# Patient Record
Sex: MALE | Race: ASIAN | NOT HISPANIC OR LATINO | URBAN - METROPOLITAN AREA
[De-identification: names, ages, dates, MRNs, and addresses within clinical notes are randomized per-mention and may not be internally consistent; named-entity substitution may affect disease eponyms.]

---

## 2020-04-06 ENCOUNTER — INPATIENT (INPATIENT)
Facility: HOSPITAL | Age: 55
LOS: 7 days | Discharge: HOME | DRG: 177 | End: 2020-04-14
Attending: INTERNAL MEDICINE | Admitting: INTERNAL MEDICINE
Payer: COMMERCIAL

## 2020-04-06 VITALS
TEMPERATURE: 99 F | SYSTOLIC BLOOD PRESSURE: 92 MMHG | DIASTOLIC BLOOD PRESSURE: 57 MMHG | RESPIRATION RATE: 17 BRPM | OXYGEN SATURATION: 89 % | WEIGHT: 153 LBS | HEIGHT: 66 IN | HEART RATE: 87 BPM

## 2020-04-06 DIAGNOSIS — R09.02 HYPOXEMIA: ICD-10-CM

## 2020-04-06 LAB
ALBUMIN SERPL ELPH-MCNC: 2.7 G/DL — LOW (ref 3.5–5)
ALP SERPL-CCNC: 71 U/L — SIGNIFICANT CHANGE UP (ref 40–120)
ALT FLD-CCNC: 63 U/L DA — HIGH (ref 10–60)
ANION GAP SERPL CALC-SCNC: 7 MMOL/L — SIGNIFICANT CHANGE UP (ref 5–17)
AST SERPL-CCNC: 55 U/L — HIGH (ref 10–40)
BASE EXCESS BLDV CALC-SCNC: 0.4 MMOL/L — SIGNIFICANT CHANGE UP (ref -2–2)
BASOPHILS # BLD AUTO: 0.01 K/UL — SIGNIFICANT CHANGE UP (ref 0–0.2)
BASOPHILS NFR BLD AUTO: 0.1 % — SIGNIFICANT CHANGE UP (ref 0–2)
BILIRUB SERPL-MCNC: 0.5 MG/DL — SIGNIFICANT CHANGE UP (ref 0.2–1.2)
BUN SERPL-MCNC: 8 MG/DL — SIGNIFICANT CHANGE UP (ref 7–18)
CALCIUM SERPL-MCNC: 8.4 MG/DL — SIGNIFICANT CHANGE UP (ref 8.4–10.5)
CHLORIDE SERPL-SCNC: 106 MMOL/L — SIGNIFICANT CHANGE UP (ref 96–108)
CO2 SERPL-SCNC: 22 MMOL/L — SIGNIFICANT CHANGE UP (ref 22–31)
CREAT SERPL-MCNC: 0.8 MG/DL — SIGNIFICANT CHANGE UP (ref 0.5–1.3)
EOSINOPHIL # BLD AUTO: 0 K/UL — SIGNIFICANT CHANGE UP (ref 0–0.5)
EOSINOPHIL NFR BLD AUTO: 0 % — SIGNIFICANT CHANGE UP (ref 0–6)
GLUCOSE SERPL-MCNC: 184 MG/DL — HIGH (ref 70–99)
HCO3 BLDV-SCNC: 25 MMOL/L — SIGNIFICANT CHANGE UP (ref 21–29)
HCT VFR BLD CALC: 32.9 % — LOW (ref 39–50)
HGB BLD-MCNC: 10.6 G/DL — LOW (ref 13–17)
HOROWITZ INDEX BLDV+IHG-RTO: 21 — SIGNIFICANT CHANGE UP
IMM GRANULOCYTES NFR BLD AUTO: 1 % — SIGNIFICANT CHANGE UP (ref 0–1.5)
LACTATE SERPL-SCNC: 1.7 MMOL/L — SIGNIFICANT CHANGE UP (ref 0.7–2)
LYMPHOCYTES # BLD AUTO: 1.23 K/UL — SIGNIFICANT CHANGE UP (ref 1–3.3)
LYMPHOCYTES # BLD AUTO: 17.3 % — SIGNIFICANT CHANGE UP (ref 13–44)
MCHC RBC-ENTMCNC: 27.7 PG — SIGNIFICANT CHANGE UP (ref 27–34)
MCHC RBC-ENTMCNC: 32.2 GM/DL — SIGNIFICANT CHANGE UP (ref 32–36)
MCV RBC AUTO: 86.1 FL — SIGNIFICANT CHANGE UP (ref 80–100)
MONOCYTES # BLD AUTO: 0.5 K/UL — SIGNIFICANT CHANGE UP (ref 0–0.9)
MONOCYTES NFR BLD AUTO: 7 % — SIGNIFICANT CHANGE UP (ref 2–14)
NEUTROPHILS # BLD AUTO: 5.31 K/UL — SIGNIFICANT CHANGE UP (ref 1.8–7.4)
NEUTROPHILS NFR BLD AUTO: 74.6 % — SIGNIFICANT CHANGE UP (ref 43–77)
NRBC # BLD: 0 /100 WBCS — SIGNIFICANT CHANGE UP (ref 0–0)
PCO2 BLDV: 45 MMHG — SIGNIFICANT CHANGE UP (ref 35–50)
PH BLDV: 7.37 — SIGNIFICANT CHANGE UP (ref 7.35–7.45)
PLATELET # BLD AUTO: 308 K/UL — SIGNIFICANT CHANGE UP (ref 150–400)
PO2 BLDV: 19 MMHG — LOW (ref 25–45)
POTASSIUM SERPL-MCNC: 4.2 MMOL/L — SIGNIFICANT CHANGE UP (ref 3.5–5.3)
POTASSIUM SERPL-SCNC: 4.2 MMOL/L — SIGNIFICANT CHANGE UP (ref 3.5–5.3)
PROT SERPL-MCNC: 7.1 G/DL — SIGNIFICANT CHANGE UP (ref 6–8.3)
RBC # BLD: 3.82 M/UL — LOW (ref 4.2–5.8)
RBC # FLD: 12.3 % — SIGNIFICANT CHANGE UP (ref 10.3–14.5)
SAO2 % BLDV: 23 % — LOW (ref 67–88)
SODIUM SERPL-SCNC: 135 MMOL/L — SIGNIFICANT CHANGE UP (ref 135–145)
TROPONIN I SERPL-MCNC: <0.015 NG/ML — SIGNIFICANT CHANGE UP (ref 0–0.04)
WBC # BLD: 7.12 K/UL — SIGNIFICANT CHANGE UP (ref 3.8–10.5)
WBC # FLD AUTO: 7.12 K/UL — SIGNIFICANT CHANGE UP (ref 3.8–10.5)

## 2020-04-06 PROCEDURE — 99285 EMERGENCY DEPT VISIT HI MDM: CPT

## 2020-04-06 PROCEDURE — 71045 X-RAY EXAM CHEST 1 VIEW: CPT | Mod: 26

## 2020-04-06 RX ORDER — SODIUM CHLORIDE 9 MG/ML
500 INJECTION INTRAMUSCULAR; INTRAVENOUS; SUBCUTANEOUS ONCE
Refills: 0 | Status: COMPLETED | OUTPATIENT
Start: 2020-04-06 | End: 2020-04-06

## 2020-04-06 RX ADMIN — SODIUM CHLORIDE 500 MILLILITER(S): 9 INJECTION INTRAMUSCULAR; INTRAVENOUS; SUBCUTANEOUS at 19:40

## 2020-04-06 NOTE — ED PROVIDER NOTE - OBJECTIVE STATEMENT
Attending MD Leavitt.  Pt is a 54 yo male with pmhx of DM (on oral medication only, no insulin), HLD, 2 prev cardiac stents, lifetime non-smoker.  Pt presents to ED because he has had a cough, fever, cold sxs since Wednesday of this week.  Endorses fever, and generalized malaise.  In Ed currently pt is sating 88% at rest ORA, inc to 97% on NRB.  He was seen at [facility] over the last few days and rxed '2 antibiotics and a cough medicine but it is not helping'.  Pt's Wife: Alyssa Suárez phone [phone] has been updated with same [facility] .

## 2020-04-06 NOTE — ED ADULT NURSE NOTE - ED STAT RN HANDOFF DETAILS
Report given to ZACHARIAH Carter. No acute distress noted, denies chest pain, no shortness of breath indicated. Safety maintained.

## 2020-04-06 NOTE — ED PROVIDER NOTE - PROGRESS NOTE DETAILS
Attending MD Leavitt.  Pt endorsed to Dr. Samuels and MAR for admission to medicine in stable condition.

## 2020-04-06 NOTE — ED ADULT NURSE NOTE - OBJECTIVE STATEMENT
Pt c/o on and off fever for 2 weeks. Pt sitting in chair, no acute distress noted, pt on o2 non-rebreather. Safety maintained.

## 2020-04-06 NOTE — ED PROVIDER NOTE - CLINICAL SUMMARY MEDICAL DECISION MAKING FREE TEXT BOX
Attending MD Leavitt.  Pt has probable COVID-19 and is requiring oxygen to keep o2 sats >90% at rest.  Planned labs, CXR, EKG and admission.  Will administer ceftriaxone for PPX of bacterial PNA.

## 2020-04-06 NOTE — ED PROVIDER NOTE - PMH
Diabetes    Hyperlipidemia, unspecified hyperlipidemia type    Hypertension    Stented coronary artery  2

## 2020-04-07 DIAGNOSIS — E11.9 TYPE 2 DIABETES MELLITUS WITHOUT COMPLICATIONS: ICD-10-CM

## 2020-04-07 DIAGNOSIS — Z29.9 ENCOUNTER FOR PROPHYLACTIC MEASURES, UNSPECIFIED: ICD-10-CM

## 2020-04-07 DIAGNOSIS — E78.5 HYPERLIPIDEMIA, UNSPECIFIED: ICD-10-CM

## 2020-04-07 DIAGNOSIS — I10 ESSENTIAL (PRIMARY) HYPERTENSION: ICD-10-CM

## 2020-04-07 DIAGNOSIS — J18.9 PNEUMONIA, UNSPECIFIED ORGANISM: ICD-10-CM

## 2020-04-07 DIAGNOSIS — I25.10 ATHEROSCLEROTIC HEART DISEASE OF NATIVE CORONARY ARTERY WITHOUT ANGINA PECTORIS: ICD-10-CM

## 2020-04-07 DIAGNOSIS — J96.01 ACUTE RESPIRATORY FAILURE WITH HYPOXIA: ICD-10-CM

## 2020-04-07 LAB
GLUCOSE BLDC GLUCOMTR-MCNC: 140 MG/DL — HIGH (ref 70–99)
GLUCOSE BLDC GLUCOMTR-MCNC: 155 MG/DL — HIGH (ref 70–99)
GLUCOSE BLDC GLUCOMTR-MCNC: 157 MG/DL — HIGH (ref 70–99)

## 2020-04-07 PROCEDURE — 99223 1ST HOSP IP/OBS HIGH 75: CPT

## 2020-04-07 RX ORDER — HYDROXYCHLOROQUINE SULFATE 200 MG
TABLET ORAL
Refills: 0 | Status: COMPLETED | OUTPATIENT
Start: 2020-04-07 | End: 2020-04-11

## 2020-04-07 RX ORDER — THIAMINE MONONITRATE (VIT B1) 100 MG
200 TABLET ORAL DAILY
Refills: 0 | Status: DISCONTINUED | OUTPATIENT
Start: 2020-04-07 | End: 2020-04-07

## 2020-04-07 RX ORDER — ASCORBIC ACID 60 MG
500 TABLET,CHEWABLE ORAL DAILY
Refills: 0 | Status: DISCONTINUED | OUTPATIENT
Start: 2020-04-07 | End: 2020-04-07

## 2020-04-07 RX ORDER — CLOPIDOGREL BISULFATE 75 MG/1
1 TABLET, FILM COATED ORAL
Qty: 0 | Refills: 0 | DISCHARGE

## 2020-04-07 RX ORDER — AZITHROMYCIN 500 MG/1
500 TABLET, FILM COATED ORAL DAILY
Refills: 0 | Status: COMPLETED | OUTPATIENT
Start: 2020-04-07 | End: 2020-04-07

## 2020-04-07 RX ORDER — INSULIN LISPRO 100/ML
VIAL (ML) SUBCUTANEOUS
Refills: 0 | Status: DISCONTINUED | OUTPATIENT
Start: 2020-04-07 | End: 2020-04-14

## 2020-04-07 RX ORDER — HEPARIN SODIUM 5000 [USP'U]/ML
5000 INJECTION INTRAVENOUS; SUBCUTANEOUS EVERY 8 HOURS
Refills: 0 | Status: DISCONTINUED | OUTPATIENT
Start: 2020-04-07 | End: 2020-04-14

## 2020-04-07 RX ORDER — HYDROXYCHLOROQUINE SULFATE 200 MG
400 TABLET ORAL EVERY 12 HOURS
Refills: 0 | Status: COMPLETED | OUTPATIENT
Start: 2020-04-07 | End: 2020-04-07

## 2020-04-07 RX ORDER — THIAMINE MONONITRATE (VIT B1) 100 MG
200 TABLET ORAL EVERY 24 HOURS
Refills: 0 | Status: DISCONTINUED | OUTPATIENT
Start: 2020-04-07 | End: 2020-04-07

## 2020-04-07 RX ORDER — CHOLECALCIFEROL (VITAMIN D3) 125 MCG
1000 CAPSULE ORAL DAILY
Refills: 0 | Status: DISCONTINUED | OUTPATIENT
Start: 2020-04-07 | End: 2020-04-14

## 2020-04-07 RX ORDER — HYDROXYCHLOROQUINE SULFATE 200 MG
200 TABLET ORAL EVERY 12 HOURS
Refills: 0 | Status: COMPLETED | OUTPATIENT
Start: 2020-04-08 | End: 2020-04-11

## 2020-04-07 RX ADMIN — HEPARIN SODIUM 5000 UNIT(S): 5000 INJECTION INTRAVENOUS; SUBCUTANEOUS at 12:13

## 2020-04-07 RX ADMIN — HEPARIN SODIUM 5000 UNIT(S): 5000 INJECTION INTRAVENOUS; SUBCUTANEOUS at 06:11

## 2020-04-07 RX ADMIN — AZITHROMYCIN 500 MILLIGRAM(S): 500 TABLET, FILM COATED ORAL at 12:13

## 2020-04-07 RX ADMIN — Medication 400 MILLIGRAM(S): at 12:13

## 2020-04-07 RX ADMIN — Medication 400 MILLIGRAM(S): at 04:01

## 2020-04-07 RX ADMIN — HEPARIN SODIUM 5000 UNIT(S): 5000 INJECTION INTRAVENOUS; SUBCUTANEOUS at 22:04

## 2020-04-07 RX ADMIN — Medication 200 MILLIGRAM(S): at 12:13

## 2020-04-07 RX ADMIN — Medication 500 MILLIGRAM(S): at 12:13

## 2020-04-07 RX ADMIN — Medication 1000 UNIT(S): at 12:13

## 2020-04-07 NOTE — H&P ADULT - PROBLEM SELECTOR PLAN 3
Pt takes lisinopril at home   Holding due to acute infection  Monitor blood pressure closely Pt is on metformin at home  Holding at present  Will start on sliding scale for now   Monitor finger stick closely\  f/u hba1c

## 2020-04-07 NOTE — H&P ADULT - NSHPPHYSICALEXAM_GEN_ALL_CORE
PHYSICAL EXAM:  GENERAL: NAD,   HEAD: Atraumatic, Normocephalic  EYES: conjunctiva and sclera clear  NECK: Supple, No JVD  CHEST/LUNG: b/l decreased breath sounds  HEART: Regular rate and rhythm; No murmurs;   ABDOMEN: Soft, Nontender, Nondistended; Bowel sounds present; No guarding  EXTREMITIES:  2+ Peripheral Pulses, No cyanosis or edema  PSYCH: AAOx3  NEUROLOGY: non-focal  SKIN: No rashes or lesions

## 2020-04-07 NOTE — H&P ADULT - PROBLEM SELECTOR PLAN 4
c/w aspirin and plavix Pt takes lisinopril at home   Holding due to acute infection  Monitor blood pressure closely

## 2020-04-07 NOTE — H&P ADULT - PROBLEM SELECTOR PLAN 6
Improve score 2  Continue with lovenox for dvt ppx Pt takes atorvastatin at home  f/u lipid profile in am

## 2020-04-07 NOTE — H&P ADULT - HISTORY OF PRESENT ILLNESS
Pt is a 56 yo male with pmhx of DM, HLD, 2 prev cardiac stents, lifetime non-smoker who presented to ED with c/o cough, fever, cold for few days. Pt Endorses fever, and generalized malaise.  In Ed pt was noted to be hypoxic RA and was placed on non-rebreather with improvement in saturation.   In ED, Pt vitals were  T(F): 98.1   HR: 84   BP: 111/67   RR: 20   SpO2: 100%   GOC: Full code

## 2020-04-07 NOTE — H&P ADULT - PROBLEM SELECTOR PLAN 1
Pt coming in with fever and sob  Noted with desaturation on RA->started on NRB  Pt started on plaquinel, zithromax and vitamins  f/u covid panel, rvp and flu  Low threshold for intubation

## 2020-04-07 NOTE — H&P ADULT - ASSESSMENT
Pt is a 54 yo male with pmhx of DM, HLD, 2 prev cardiac stents, lifetime non-smoker who presented to ED with c/o cough, fever, cold for few days. Pt Endorses fever, and generalized malaise.  In Ed pt was noted to be hypoxic RA and was placed on non-rebreather with improvement in saturation.   In ED, Pt vitals were  T(F): 98.1   HR: 84   BP: 111/67   RR: 20   SpO2: 100%   GOC: Full code

## 2020-04-07 NOTE — H&P ADULT - PROBLEM SELECTOR PLAN 2
Pt is on metformin at home  Holding at present  Will start on sliding scale for now   Monitor finger stick closely\  f/u hba1c Pt coming in with fever and sob  Noted with desaturation on RA->started on NRB  Pt started on plaquinel, zithromax and vitamins  f/u covid panel, rvp and flu  Low threshold for intubation

## 2020-04-07 NOTE — H&P ADULT - ATTENDING COMMENTS
Pt seen and examined.  Case discussed with Housestaff.  Agree with HPI/assessment and plans    Vital Signs Last 24 Hrs  T(C): 37.5 (07 Apr 2020 19:57), Max: 37.5 (07 Apr 2020 19:57)  T(F): 99.5 (07 Apr 2020 19:57), Max: 99.5 (07 Apr 2020 19:57)  HR: 79 (07 Apr 2020 19:57) (70 - 84)  BP: 114/73 (07 Apr 2020 19:57) (100/57 - 115/65)  RR: 22 (07 Apr 2020 19:57) (18 - 34)  SpO2: 100% (07 Apr 2020 19:57) (91% - 100%)    Middle aged man, Ukrainian speaking, NAD on o2 supplements  CTA b/l with decreased breath sounds in both bases.  RRR S1S2 only   Soft NT ND BS +  No pedal edema    Labs reviewed  CXR noted    Impression  Acute respiratory failure with hypoxia  Bilateral pneumonia    Plan  Admit to Medicine with COVID 19 isolation protocol  F/up pending covid pcr and baseline acute phase reactants and serially  Supplemental O2 with close monitoring  Empiric antibiotics  Plaquenil regimen  Supportive care .

## 2020-04-08 LAB
ALBUMIN SERPL ELPH-MCNC: 2.8 G/DL — LOW (ref 3.5–5)
ALP SERPL-CCNC: 100 U/L — SIGNIFICANT CHANGE UP (ref 40–120)
ALT FLD-CCNC: 79 U/L DA — HIGH (ref 10–60)
ANION GAP SERPL CALC-SCNC: 6 MMOL/L — SIGNIFICANT CHANGE UP (ref 5–17)
AST SERPL-CCNC: 45 U/L — HIGH (ref 10–40)
BILIRUB SERPL-MCNC: 0.8 MG/DL — SIGNIFICANT CHANGE UP (ref 0.2–1.2)
BUN SERPL-MCNC: 9 MG/DL — SIGNIFICANT CHANGE UP (ref 7–18)
CALCIUM SERPL-MCNC: 9.2 MG/DL — SIGNIFICANT CHANGE UP (ref 8.4–10.5)
CHLORIDE SERPL-SCNC: 102 MMOL/L — SIGNIFICANT CHANGE UP (ref 96–108)
CO2 SERPL-SCNC: 30 MMOL/L — SIGNIFICANT CHANGE UP (ref 22–31)
CREAT SERPL-MCNC: 0.7 MG/DL — SIGNIFICANT CHANGE UP (ref 0.5–1.3)
D DIMER BLD IA.RAPID-MCNC: 765 NG/ML DDU — HIGH
FERRITIN SERPL-MCNC: 659 NG/ML — HIGH (ref 30–400)
GLUCOSE BLDC GLUCOMTR-MCNC: 118 MG/DL — HIGH (ref 70–99)
GLUCOSE BLDC GLUCOMTR-MCNC: 138 MG/DL — HIGH (ref 70–99)
GLUCOSE BLDC GLUCOMTR-MCNC: 156 MG/DL — HIGH (ref 70–99)
GLUCOSE BLDC GLUCOMTR-MCNC: 211 MG/DL — HIGH (ref 70–99)
GLUCOSE SERPL-MCNC: 139 MG/DL — HIGH (ref 70–99)
HBA1C BLD-MCNC: 7.8 % — HIGH (ref 4–5.6)
HCT VFR BLD CALC: 36.5 % — LOW (ref 39–50)
HGB BLD-MCNC: 11.7 G/DL — LOW (ref 13–17)
LDH SERPL L TO P-CCNC: 347 U/L — HIGH (ref 120–225)
MCHC RBC-ENTMCNC: 27.3 PG — SIGNIFICANT CHANGE UP (ref 27–34)
MCHC RBC-ENTMCNC: 32.1 GM/DL — SIGNIFICANT CHANGE UP (ref 32–36)
MCV RBC AUTO: 85.1 FL — SIGNIFICANT CHANGE UP (ref 80–100)
NRBC # BLD: 0 /100 WBCS — SIGNIFICANT CHANGE UP (ref 0–0)
PLATELET # BLD AUTO: 431 K/UL — HIGH (ref 150–400)
POTASSIUM SERPL-MCNC: 4 MMOL/L — SIGNIFICANT CHANGE UP (ref 3.5–5.3)
POTASSIUM SERPL-SCNC: 4 MMOL/L — SIGNIFICANT CHANGE UP (ref 3.5–5.3)
PROT SERPL-MCNC: 7.5 G/DL — SIGNIFICANT CHANGE UP (ref 6–8.3)
RBC # BLD: 4.29 M/UL — SIGNIFICANT CHANGE UP (ref 4.2–5.8)
RBC # FLD: 12.1 % — SIGNIFICANT CHANGE UP (ref 10.3–14.5)
SARS-COV-2 RNA SPEC QL NAA+PROBE: (no result)
SODIUM SERPL-SCNC: 138 MMOL/L — SIGNIFICANT CHANGE UP (ref 135–145)
WBC # BLD: 7.71 K/UL — SIGNIFICANT CHANGE UP (ref 3.8–10.5)
WBC # FLD AUTO: 7.71 K/UL — SIGNIFICANT CHANGE UP (ref 3.8–10.5)

## 2020-04-08 PROCEDURE — 99233 SBSQ HOSP IP/OBS HIGH 50: CPT | Mod: GC

## 2020-04-08 RX ORDER — ACETAMINOPHEN 500 MG
650 TABLET ORAL EVERY 6 HOURS
Refills: 0 | Status: DISCONTINUED | OUTPATIENT
Start: 2020-04-08 | End: 2020-04-14

## 2020-04-08 RX ADMIN — HEPARIN SODIUM 5000 UNIT(S): 5000 INJECTION INTRAVENOUS; SUBCUTANEOUS at 05:13

## 2020-04-08 RX ADMIN — Medication 2: at 12:09

## 2020-04-08 RX ADMIN — Medication 1000 UNIT(S): at 12:07

## 2020-04-08 RX ADMIN — Medication 200 MILLIGRAM(S): at 12:13

## 2020-04-08 RX ADMIN — HEPARIN SODIUM 5000 UNIT(S): 5000 INJECTION INTRAVENOUS; SUBCUTANEOUS at 12:11

## 2020-04-08 RX ADMIN — Medication 200 MILLIGRAM(S): at 00:19

## 2020-04-08 RX ADMIN — Medication 650 MILLIGRAM(S): at 12:54

## 2020-04-08 RX ADMIN — HEPARIN SODIUM 5000 UNIT(S): 5000 INJECTION INTRAVENOUS; SUBCUTANEOUS at 21:15

## 2020-04-08 NOTE — PROGRESS NOTE ADULT - SUBJECTIVE AND OBJECTIVE BOX
Chart reviewed.  Patient seen and examined.    Patient is a 55y old  Male who presents with a chief complaint of Shortness of breath (08 Apr 2020 01:01)    55 year old Man with hx DM, HLD, 2 prev cardiac stents, lifetime non-smoker who 4/6/2020 [facility] with c/o cough, fever, chills, fever, and generalized malaise.  Pt in ED found to be hypoxic requiring NRM.     Subjective: feels "ok"; no issues overnight    MEDICATIONS  (STANDING):  cholecalciferol 1000 Unit(s) Oral daily  heparin  Injectable 5000 Unit(s) SubCutaneous every 8 hours  hydroxychloroquine   Oral   hydroxychloroquine 200 milliGRAM(s) Oral every 12 hours  insulin lispro (HumaLOG) corrective regimen sliding scale   SubCutaneous three times a day before meals    MEDICATIONS  (PRN):  acetaminophen   Tablet .. 650 milliGRAM(s) Oral every 6 hours PRN Temp greater or equal to 38C (100.4F), Mild Pain (1 - 3)    VITALS:  Vital Signs Last 24 Hrs  T(C): 38.7 (08 Apr 2020 11:59), Max: 38.7 (08 Apr 2020 11:59)  T(F): 101.6 (08 Apr 2020 11:59), Max: 101.6 (08 Apr 2020 11:59)  HR: 88 (08 Apr 2020 11:59) (65 - 88)  BP: 100/64 (08 Apr 2020 11:59) (93/59 - 114/73)  BP(mean): --  RR: 24 (08 Apr 2020 11:59) (20 - 32)  SpO2: 94% (08 Apr 2020 11:59) (88% - 100%)    FOCUS PHYSICAL EXAM:  GENERAL: ill appearing  Head: AT/NC  eyes: nonicteric  Mouth: dry membranes  RESP: even and unlabored breathing with NRM, desats to 88% on rm air  BREAST:  not examined  NEURO: AAOX3, follows all commands, able to move extremities spontaneously    LABS                      11.7   7.71  )-----------( 431      ( 08 Apr 2020 09:01 )             36.5     04-08    138  |  102  |  9   ----------------------------<  139<H>  4.0   |  30  |  0.70    Ca    9.2      08 Apr 2020 09:01    TPro  7.5  /  Alb  2.8<L>  /  TBili  0.8  /  DBili  x   /  AST  45<H>  /  ALT  79<H>  /  AlkPhos  100  04-08    CARDIAC MARKERS ( 06 Apr 2020 19:55 )  <0.015 ng/mL / x     / x     / x     / x        LIVER FUNCTIONS - ( 08 Apr 2020 09:01 )  Alb: 2.8 g/dL / Pro: 7.5 g/dL / ALK PHOS: 100 U/L / ALT: 79 U/L DA / AST: 45 U/L / GGT: x

## 2020-04-08 NOTE — DISCHARGE NOTE PROVIDER - HOSPITAL COURSE
Pt is a 56 yo male with pmh of DM, HLD, 2 prev cardiac stents, lifetime non-smoker who presented to ED with c/o cough, fever, cold for few days. Pt Endorses fever, and generalized malaise.  In Ed pt was noted to be hypoxic RA and was placed on non-rebreather with improvement in saturation. CXR with Bilateral lung airspace consolidations concerning for infectious bacterial or viral pneumonia. Treated with antibiotics, hydroxychloroquine. Airborne and contact precautions.        NOT COMPLETE Pt is a 56 yo male with pmh of DM, HLD, 2 prev cardiac stents, lifetime non-smoker who presented to ED with c/o cough, fever, cold for few days, pt endorsed fever, and generalized malaise.  In Ed pt was noted to be hypoxic RA and was placed on non-rebreather with improvement in saturation. CXR with Bilateral lung airspace consolidations concerning for infectious bacterial or viral pneumonia. Treated with antibiotics, hydroxychloroquine. Airborne and contact precautions.  Patient's 02 saturation 99% on roomair while at rest, and 93% on roomair with ambulation today (4/13).                  CORONAVIRUS INSTRUCTIONS:     Based on your current clinical status and stability, it has been determined that you no longer need hospitalization and can recover while remaining in self-quarantine at home. You should follow the prevention steps below until a healthcare provider or local or state health department says you can return to your normal activities.         1. You should restrict activities outside your home, except for getting medical care.     2. Do not go to work, school, or public areas.     3. Avoid using public transportation, ride-sharing, or taxis.     4. Separate yourself from other people and animals in your home as much as possible.  When you are around other people (e.g., sharing a room or vehicle) you should wear a facemask.    5. Wash your hands often with soap and water for at least 20 seconds, especially after blowing your nose, coughing, or sneezing; going to the bathroom; and before eating or preparing food.    6. Cover your mouth and nose with a tissue when you cough or sneeze. Throw used tissues in a lined trash can. Immediately wash your hands with soap and water for at least 20 seconds    7. You need to frequently clean high touch surfaces include counters, tabletops, doorknobs, bathroom fixtures, toilets, phones, keyboards, tablets, and bedside tables.    8. Avoid sharing dishes, drinking glasses, cups, eating utensils, towels, or bedding with other people or pets in your home. After using these items, they should be washed thoroughly with soap and water.    You are strongly advised to seek prompt medical attention if your illness worsens or you develop new symptoms like fever or difficulty breathing.     Please call   to speak with your discharging physician if you have any questions. Pt is a 54 yo male with pmh of DM, HLD, 2 prev cardiac stents, lifetime non-smoker who presented to ED with c/o cough, fever, cold for few days, pt endorsed fever, and generalized malaise.  In Ed pt was noted to be hypoxic RA and was placed on non-rebreather with improvement in saturation. CXR with Bilateral lung airspace consolidations concerning for infectious bacterial or viral pneumonia. Treated with antibiotics, hydroxychloroquine. Airborne and contact precautions.  Pt titrated off oxygen and maintaining 02 saturation >94% on roomair.                  CORONAVIRUS INSTRUCTIONS:     Based on your current clinical status and stability, it has been determined that you no longer need hospitalization and can recover while remaining in self-quarantine at home. You should follow the prevention steps below until a healthcare provider or local or state health department says you can return to your normal activities.         1. You should restrict activities outside your home, except for getting medical care.     2. Do not go to work, school, or public areas.     3. Avoid using public transportation, ride-sharing, or taxis.     4. Separate yourself from other people and animals in your home as much as possible.  When you are around other people (e.g., sharing a room or vehicle) you should wear a facemask.    5. Wash your hands often with soap and water for at least 20 seconds, especially after blowing your nose, coughing, or sneezing; going to the bathroom; and before eating or preparing food.    6. Cover your mouth and nose with a tissue when you cough or sneeze. Throw used tissues in a lined trash can. Immediately wash your hands with soap and water for at least 20 seconds    7. You need to frequently clean high touch surfaces include counters, tabletops, doorknobs, bathroom fixtures, toilets, phones, keyboards, tablets, and bedside tables.    8. Avoid sharing dishes, drinking glasses, cups, eating utensils, towels, or bedding with other people or pets in your home. After using these items, they should be washed thoroughly with soap and water.    You are strongly advised to seek prompt medical attention if your illness worsens or you develop new symptoms like fever or difficulty breathing.     Please call   to speak with your discharging physician if you have any questions.

## 2020-04-08 NOTE — PROGRESS NOTE ADULT - ATTENDING COMMENTS
Patient seen/evaluated at bedside 4/8/2020. I agree with the resident progress note/outlined plan of care. My independent findings and conclusions are documented. Patient seen/evaluated at bedside 4/8/2020. I agree with the resident progress note/outlined plan of care. My independent findings and conclusions are documented.    Reports anorexia.  No diarrhea/ nausea/vomiting. Slightly anxious. Still with dyspne w/ mild exertion. Remains on NRB but discussed with RN, attempt to titrate down non-rebreather. Tmax 101.8     AAOx3 able to speak in complete sentences  bilateral crackles  S1S2 RRR  soft, NT, ND, + BS  no LE edema/cyanosis/clubbing  moves all extremities symmetrically    labs reviewed    1. acute hypoxic respiratory failure  2. pneumonia s/t novel coronavirus-19  3. DM TII on oral hypoglycemic    maintain 02>94%. Titrate down non-rebreather to 10 L if tolerated--> discussed with RN at bedside  f/u repeat serologies: ferritin, LDH, d dimer  trial of steroids: solumedrol 40mg IV bid if hypoxia non-improved  dvt ppx: dose is dependent of repeat d dimer

## 2020-04-08 NOTE — DISCHARGE NOTE PROVIDER - NSDCCPCAREPLAN_GEN_ALL_CORE_FT
PRINCIPAL DISCHARGE DIAGNOSIS  Diagnosis: Pneumonia due to 2019 novel coronavirus  Assessment and Plan of Treatment: You were found to have Covid 19. You were treated with antibiotics to protect you from bacterial infection. Please maintain quarentine for 14 days. Seek immediate medical attention if you develop shortness of breath. See additional instructions below. PRINCIPAL DISCHARGE DIAGNOSIS  Diagnosis: Pneumonia due to 2019 novel coronavirus  Assessment and Plan of Treatment: You were found to have Covid 19. Please maintain quarentine for 14 days. Seek immediate medical attention if you develop shortness of breath. See additional instructions below.        SECONDARY DISCHARGE DIAGNOSES  Diagnosis: Type 2 diabetes mellitus  Assessment and Plan of Treatment: Your hemoglobin A1C level was 7.8% which is above normal.   Continue metformin  Monitor your blood glucose levels at home  Follow-up up with your endocrinologist for ongoing management PRINCIPAL DISCHARGE DIAGNOSIS  Diagnosis: Pneumonia due to 2019 novel coronavirus  Assessment and Plan of Treatment: You were found to have Covid 19. Please maintain quarantine for 14 days. Seek immediate medical attention if you develop shortness of breath. See additional instructions below.        SECONDARY DISCHARGE DIAGNOSES  Diagnosis: Type 2 diabetes mellitus  Assessment and Plan of Treatment: Your hemoglobin A1C level was 7.8% which is above normal.   Continue metformin  Monitor your blood glucose levels at home  Follow-up up with your endocrinologist for ongoing management

## 2020-04-09 LAB
ALBUMIN SERPL ELPH-MCNC: 2.4 G/DL — LOW (ref 3.5–5)
ALP SERPL-CCNC: 109 U/L — SIGNIFICANT CHANGE UP (ref 40–120)
ALT FLD-CCNC: 72 U/L DA — HIGH (ref 10–60)
ANION GAP SERPL CALC-SCNC: 5 MMOL/L — SIGNIFICANT CHANGE UP (ref 5–17)
AST SERPL-CCNC: 35 U/L — SIGNIFICANT CHANGE UP (ref 10–40)
BILIRUB SERPL-MCNC: 0.6 MG/DL — SIGNIFICANT CHANGE UP (ref 0.2–1.2)
BUN SERPL-MCNC: 14 MG/DL — SIGNIFICANT CHANGE UP (ref 7–18)
CALCIUM SERPL-MCNC: 9.1 MG/DL — SIGNIFICANT CHANGE UP (ref 8.4–10.5)
CHLORIDE SERPL-SCNC: 101 MMOL/L — SIGNIFICANT CHANGE UP (ref 96–108)
CO2 SERPL-SCNC: 30 MMOL/L — SIGNIFICANT CHANGE UP (ref 22–31)
CREAT SERPL-MCNC: 0.71 MG/DL — SIGNIFICANT CHANGE UP (ref 0.5–1.3)
D DIMER BLD IA.RAPID-MCNC: 604 NG/ML DDU — HIGH
FERRITIN SERPL-MCNC: 666 NG/ML — HIGH (ref 30–400)
GLUCOSE BLDC GLUCOMTR-MCNC: 130 MG/DL — HIGH (ref 70–99)
GLUCOSE BLDC GLUCOMTR-MCNC: 191 MG/DL — HIGH (ref 70–99)
GLUCOSE BLDC GLUCOMTR-MCNC: 192 MG/DL — HIGH (ref 70–99)
GLUCOSE BLDC GLUCOMTR-MCNC: 237 MG/DL — HIGH (ref 70–99)
GLUCOSE BLDC GLUCOMTR-MCNC: 254 MG/DL — HIGH (ref 70–99)
GLUCOSE SERPL-MCNC: 135 MG/DL — HIGH (ref 70–99)
HCT VFR BLD CALC: 32.6 % — LOW (ref 39–50)
HGB BLD-MCNC: 10.4 G/DL — LOW (ref 13–17)
LDH SERPL L TO P-CCNC: 317 U/L — HIGH (ref 120–225)
MCHC RBC-ENTMCNC: 27.3 PG — SIGNIFICANT CHANGE UP (ref 27–34)
MCHC RBC-ENTMCNC: 31.9 GM/DL — LOW (ref 32–36)
MCV RBC AUTO: 85.6 FL — SIGNIFICANT CHANGE UP (ref 80–100)
NRBC # BLD: 0 /100 WBCS — SIGNIFICANT CHANGE UP (ref 0–0)
PLATELET # BLD AUTO: 464 K/UL — HIGH (ref 150–400)
POTASSIUM SERPL-MCNC: 4.2 MMOL/L — SIGNIFICANT CHANGE UP (ref 3.5–5.3)
POTASSIUM SERPL-SCNC: 4.2 MMOL/L — SIGNIFICANT CHANGE UP (ref 3.5–5.3)
PROT SERPL-MCNC: 7.2 G/DL — SIGNIFICANT CHANGE UP (ref 6–8.3)
RBC # BLD: 3.81 M/UL — LOW (ref 4.2–5.8)
RBC # FLD: 11.9 % — SIGNIFICANT CHANGE UP (ref 10.3–14.5)
SODIUM SERPL-SCNC: 136 MMOL/L — SIGNIFICANT CHANGE UP (ref 135–145)
WBC # BLD: 8.99 K/UL — SIGNIFICANT CHANGE UP (ref 3.8–10.5)
WBC # FLD AUTO: 8.99 K/UL — SIGNIFICANT CHANGE UP (ref 3.8–10.5)

## 2020-04-09 PROCEDURE — 99233 SBSQ HOSP IP/OBS HIGH 50: CPT

## 2020-04-09 RX ORDER — INSULIN LISPRO 100/ML
VIAL (ML) SUBCUTANEOUS AT BEDTIME
Refills: 0 | Status: DISCONTINUED | OUTPATIENT
Start: 2020-04-09 | End: 2020-04-11

## 2020-04-09 RX ADMIN — Medication 200 MILLIGRAM(S): at 23:34

## 2020-04-09 RX ADMIN — Medication 40 MILLIGRAM(S): at 21:08

## 2020-04-09 RX ADMIN — HEPARIN SODIUM 5000 UNIT(S): 5000 INJECTION INTRAVENOUS; SUBCUTANEOUS at 05:01

## 2020-04-09 RX ADMIN — Medication 100 MILLIGRAM(S): at 21:07

## 2020-04-09 RX ADMIN — Medication 1000 UNIT(S): at 12:08

## 2020-04-09 RX ADMIN — Medication 1: at 12:09

## 2020-04-09 RX ADMIN — HEPARIN SODIUM 5000 UNIT(S): 5000 INJECTION INTRAVENOUS; SUBCUTANEOUS at 21:08

## 2020-04-09 RX ADMIN — Medication 200 MILLIGRAM(S): at 12:08

## 2020-04-09 RX ADMIN — HEPARIN SODIUM 5000 UNIT(S): 5000 INJECTION INTRAVENOUS; SUBCUTANEOUS at 12:10

## 2020-04-09 RX ADMIN — Medication 100 MILLIGRAM(S): at 12:09

## 2020-04-09 RX ADMIN — Medication 100 MILLIGRAM(S): at 17:28

## 2020-04-09 RX ADMIN — Medication 40 MILLIGRAM(S): at 14:05

## 2020-04-09 RX ADMIN — Medication 200 MILLIGRAM(S): at 00:04

## 2020-04-09 RX ADMIN — Medication 1: at 17:28

## 2020-04-09 NOTE — PROGRESS NOTE ADULT - SUBJECTIVE AND OBJECTIVE BOX
Chart reviewed.  Patient seen and examined.    Patient is a 55y old  Male who presents with a chief complaint of Shortness of breath (08 Apr 2020 01:01)    55 year old Man with hx DM, HLD, 2 prev cardiac stents, lifetime non-smoker who 4/6/2020 [facility] with c/o cough, fever, chills, fever, and generalized malaise.  Pt in ED found to be hypoxic requiring NRM.     Subjective: feels "same"; "not getting better"; no issues overnight      MEDICATIONS  (STANDING):  benzonatate 100 milliGRAM(s) Oral every 8 hours  cholecalciferol 1000 Unit(s) Oral daily  heparin  Injectable 5000 Unit(s) SubCutaneous every 8 hours  hydroxychloroquine   Oral   hydroxychloroquine 200 milliGRAM(s) Oral every 12 hours  insulin lispro (HumaLOG) corrective regimen sliding scale   SubCutaneous three times a day before meals    MEDICATIONS  (PRN):  acetaminophen   Tablet .. 650 milliGRAM(s) Oral every 6 hours PRN Temp greater or equal to 38C (100.4F), Mild Pain (1 - 3)      Vital Signs Last 24 Hrs  T(C): 37.1 (09 Apr 2020 12:10), Max: 37.3 (08 Apr 2020 19:09)  T(F): 98.8 (09 Apr 2020 12:10), Max: 99.2 (09 Apr 2020 05:06)  HR: 76 (09 Apr 2020 12:10) (69 - 102)  BP: 110/61 (09 Apr 2020 12:10) (100/63 - 121/71)  BP(mean): --  RR: 20 (09 Apr 2020 12:10) (20 - 26)  SpO2: 98% (09 Apr 2020 12:10) (91% - 100%)    FOCUS PHYSICAL EXAM:  GENERAL: ill appearing  Head: AT/NC  eyes: nonicteric  Mouth: dry membranes  RESP: even and unlabored breathing with NRM, desats to 88% on rm air  BREAST:  not examined  NEURO: AAOX3, follows all commands, able to move extremities spontaneously    LABS         : am labs pending collection             11.7   7.71  )-----------( 431      ( 08 Apr 2020 09:01 )             36.5     04-08    138  |  102  |  9   ----------------------------<  139<H>  4.0   |  30  |  0.70    Ca    9.2      08 Apr 2020 09:01    TPro  7.5  /  Alb  2.8<L>  /  TBili  0.8  /  DBili  x   /  AST  45<H>  /  ALT  79<H>  /  AlkPhos  100  04-08    CARDIAC MARKERS ( 06 Apr 2020 19:55 )  <0.015 ng/mL / x     / x     / x     / x        LIVER FUNCTIONS - ( 08 Apr 2020 09:01 )  Alb: 2.8 g/dL / Pro: 7.5 g/dL / ALK PHOS: 100 U/L / ALT: 79 U/L DA / AST: 45 U/L / GGT: x

## 2020-04-09 NOTE — PROGRESS NOTE ADULT - ATTENDING COMMENTS
55M with DMII, HTN, CAD admitted for acute hypoxemic respiratory failure due to COVID-19 related pneumonia  still requiring high dosing O2 on NRM with borderline SaO2 91%    HPI:  -Pt reports feeling the same, short of breath, tired, poor appetite, unable to speak full sentences.    Exam:  -Gen: lying comfortably, but tachypneic  -Pulm: bilateral crackles to mid-lung  -Ext: no lower extremity edema    Labs:                               10.4                 136  | 30   | 14           8.99  >-----------< 464     ------------------------< 135                   32.6                 4.2  | 101  | 0.71                                         Ca 9.1   Mg x     Ph x          D-Dimer Assay, Quantitative: 604 ng/mL DDU (04.09.20 @ 13:30)  D-Dimer Assay, Quantitative: 765 ng/mL DDU (04.08.20 @ 09:01)    Ferritin, Serum: 666 ng/mL (04.09.20 @ 17:46)  Ferritin, Serum: 659 ng/mL (04.08.20 @ 13:35)    Lactate Dehydrogenase, Serum: 317 U/L (04.09.20 @ 13:30)  Lactate Dehydrogenase, Serum: 347 U/L (04.08.20 @ 09:01)      Plan:  1) Acute hypoxemic respiratory failure due to COVID-19 related pneumonia, still requiring high dosing O2 on NRM with borderline SaO2 91%  -start solumedrol 40mg Q12hr x 3 days  -obtain markers: CRP, ferritin, D-dimer. If qualifying, start anakinra.  - HCQ 4/7- to complete 5 days. QTc 466. Monitor K>4, Mg>2  -low threshold for ICU    2) DVT ppx  -Lovenox sq. Improve score 2    Dr. Aliya White  [phone]  [email or web address]

## 2020-04-10 DIAGNOSIS — Z95.5 PRESENCE OF CORONARY ANGIOPLASTY IMPLANT AND GRAFT: ICD-10-CM

## 2020-04-10 DIAGNOSIS — U07.1 COVID-19: ICD-10-CM

## 2020-04-10 LAB
ALBUMIN SERPL ELPH-MCNC: 2.4 G/DL — LOW (ref 3.5–5)
ALP SERPL-CCNC: 119 U/L — SIGNIFICANT CHANGE UP (ref 40–120)
ALT FLD-CCNC: 76 U/L DA — HIGH (ref 10–60)
ANION GAP SERPL CALC-SCNC: 8 MMOL/L — SIGNIFICANT CHANGE UP (ref 5–17)
AST SERPL-CCNC: 35 U/L — SIGNIFICANT CHANGE UP (ref 10–40)
BILIRUB SERPL-MCNC: 0.5 MG/DL — SIGNIFICANT CHANGE UP (ref 0.2–1.2)
BUN SERPL-MCNC: 17 MG/DL — SIGNIFICANT CHANGE UP (ref 7–18)
CALCIUM SERPL-MCNC: 9.5 MG/DL — SIGNIFICANT CHANGE UP (ref 8.4–10.5)
CHLORIDE SERPL-SCNC: 102 MMOL/L — SIGNIFICANT CHANGE UP (ref 96–108)
CO2 SERPL-SCNC: 27 MMOL/L — SIGNIFICANT CHANGE UP (ref 22–31)
CREAT SERPL-MCNC: 0.69 MG/DL — SIGNIFICANT CHANGE UP (ref 0.5–1.3)
CRP SERPL-MCNC: 15.39 MG/DL — HIGH (ref 0–0.4)
GLUCOSE BLDC GLUCOMTR-MCNC: 214 MG/DL — HIGH (ref 70–99)
GLUCOSE BLDC GLUCOMTR-MCNC: 248 MG/DL — HIGH (ref 70–99)
GLUCOSE BLDC GLUCOMTR-MCNC: 252 MG/DL — HIGH (ref 70–99)
GLUCOSE BLDC GLUCOMTR-MCNC: 265 MG/DL — HIGH (ref 70–99)
GLUCOSE SERPL-MCNC: 208 MG/DL — HIGH (ref 70–99)
HCT VFR BLD CALC: 34.5 % — LOW (ref 39–50)
HGB BLD-MCNC: 11.3 G/DL — LOW (ref 13–17)
MAGNESIUM SERPL-MCNC: 3.2 MG/DL — HIGH (ref 1.6–2.6)
MCHC RBC-ENTMCNC: 27.8 PG — SIGNIFICANT CHANGE UP (ref 27–34)
MCHC RBC-ENTMCNC: 32.8 GM/DL — SIGNIFICANT CHANGE UP (ref 32–36)
MCV RBC AUTO: 85 FL — SIGNIFICANT CHANGE UP (ref 80–100)
NRBC # BLD: 0 /100 WBCS — SIGNIFICANT CHANGE UP (ref 0–0)
PLATELET # BLD AUTO: 506 K/UL — HIGH (ref 150–400)
POTASSIUM SERPL-MCNC: 5.2 MMOL/L — SIGNIFICANT CHANGE UP (ref 3.5–5.3)
POTASSIUM SERPL-SCNC: 5.2 MMOL/L — SIGNIFICANT CHANGE UP (ref 3.5–5.3)
PROCALCITONIN SERPL-MCNC: 0.11 NG/ML — HIGH (ref 0.02–0.1)
PROT SERPL-MCNC: 7.6 G/DL — SIGNIFICANT CHANGE UP (ref 6–8.3)
RBC # BLD: 4.06 M/UL — LOW (ref 4.2–5.8)
RBC # FLD: 11.9 % — SIGNIFICANT CHANGE UP (ref 10.3–14.5)
SODIUM SERPL-SCNC: 137 MMOL/L — SIGNIFICANT CHANGE UP (ref 135–145)
WBC # BLD: 9.77 K/UL — SIGNIFICANT CHANGE UP (ref 3.8–10.5)
WBC # FLD AUTO: 9.77 K/UL — SIGNIFICANT CHANGE UP (ref 3.8–10.5)

## 2020-04-10 PROCEDURE — 99233 SBSQ HOSP IP/OBS HIGH 50: CPT

## 2020-04-10 RX ORDER — ASPIRIN/CALCIUM CARB/MAGNESIUM 324 MG
81 TABLET ORAL DAILY
Refills: 0 | Status: DISCONTINUED | OUTPATIENT
Start: 2020-04-10 | End: 2020-04-14

## 2020-04-10 RX ORDER — CLOPIDOGREL BISULFATE 75 MG/1
75 TABLET, FILM COATED ORAL DAILY
Refills: 0 | Status: DISCONTINUED | OUTPATIENT
Start: 2020-04-10 | End: 2020-04-14

## 2020-04-10 RX ADMIN — Medication 100 MILLIGRAM(S): at 12:41

## 2020-04-10 RX ADMIN — Medication 100 MILLIGRAM(S): at 05:00

## 2020-04-10 RX ADMIN — Medication 200 MILLIGRAM(S): at 23:06

## 2020-04-10 RX ADMIN — Medication 1000 UNIT(S): at 12:44

## 2020-04-10 RX ADMIN — Medication 3: at 17:01

## 2020-04-10 RX ADMIN — Medication 81 MILLIGRAM(S): at 12:41

## 2020-04-10 RX ADMIN — HEPARIN SODIUM 5000 UNIT(S): 5000 INJECTION INTRAVENOUS; SUBCUTANEOUS at 05:00

## 2020-04-10 RX ADMIN — Medication 3: at 12:40

## 2020-04-10 RX ADMIN — Medication 2: at 09:06

## 2020-04-10 RX ADMIN — Medication 200 MILLIGRAM(S): at 12:41

## 2020-04-10 RX ADMIN — HEPARIN SODIUM 5000 UNIT(S): 5000 INJECTION INTRAVENOUS; SUBCUTANEOUS at 12:42

## 2020-04-10 RX ADMIN — Medication 40 MILLIGRAM(S): at 21:09

## 2020-04-10 RX ADMIN — Medication 100 MILLIGRAM(S): at 21:10

## 2020-04-10 RX ADMIN — HEPARIN SODIUM 5000 UNIT(S): 5000 INJECTION INTRAVENOUS; SUBCUTANEOUS at 21:10

## 2020-04-10 RX ADMIN — CLOPIDOGREL BISULFATE 75 MILLIGRAM(S): 75 TABLET, FILM COATED ORAL at 12:41

## 2020-04-10 RX ADMIN — Medication 40 MILLIGRAM(S): at 09:07

## 2020-04-10 NOTE — PROGRESS NOTE ADULT - ATTENDING COMMENTS
55M with DMII, HTN, CAD admitted for acute hypoxemic respiratory failure due to COVID-19 related pneumonia  still requiring high dosing O2 on NRM with borderline SaO2 91%    HPI:  -Pt reports feeling slightly better, still short of breath, now able to speak full sentences. Per PA, pt was desatting to84% on NRB 15L, however improved after brief proning.    Exam:  -Gen: lying comfortably, better than yesterday  -Pulm: bilateral crackles to mid-lung  -Ext: no lower extremity edema    Labs:                11.3                 137  | 27   | 17           9.77  >-----------< 506     ------------------------< 208                   34.5                 5.2  | 102  | 0.69                                         Ca 9.5   Mg 3.2   Ph x        C-Reactive Protein, Serum: 15.39 mg/dL (04.10.20 @ 09:18)            D-Dimer Assay, Quantitative: 604 ng/mL DDU (04.09.20 @ 13:30)  D-Dimer Assay, Quantitative: 765 ng/mL DDU (04.08.20 @ 09:01)    Ferritin, Serum: 666 ng/mL (04.09.20 @ 17:46)  Ferritin, Serum: 659 ng/mL (04.08.20 @ 13:35)    Lactate Dehydrogenase, Serum: 317 U/L (04.09.20 @ 13:30)  Lactate Dehydrogenase, Serum: 347 U/L (04.08.20 @ 09:01)      Plan:  1) Acute hypoxemic respiratory failure due to COVID-19 related pneumonia, still requiring high dosing O2 on NRM with borderline SaO2 91%  -cw solumedrol 40mg Q12hr x 3 days started 4/9-  -obtain markers: CRP, ferritin, D-dimer. If qualifying, start anakinra.  -cw HCQ 4/7- to complete 5 days. QTc 466. Monitor K>4, Mg>2  -low threshold for ICU    2) DVT ppx  -Lovenox sq. Improve score 2    Dr. Aliya White  [phone]  [email or web address] .

## 2020-04-10 NOTE — PROGRESS NOTE ADULT - ASSESSMENT
55 year old Man with hx DM, HLD, 2 prev cardiac stents, lifetime non-smoker who 4/6/2020 presented from home to [facility] ED with c/o cough, fever, chills, fever, and generalized malaise.  Pt in ED found to be hypoxic requiring NRM.  He is COVID positive an dbeing treated for BL PNA secondary to COVID.

## 2020-04-10 NOTE — PROGRESS NOTE ADULT - SUBJECTIVE AND OBJECTIVE BOX
INTERVAL HPI/OVERNIGHT EVENTS:  He is in NAD, O2sats 95%prone on 15LNRB.  He is conversing comfortably.      MEDICATIONS  (STANDING):  aspirin enteric coated 81 milliGRAM(s) Oral daily  benzonatate 100 milliGRAM(s) Oral every 8 hours  cholecalciferol 1000 Unit(s) Oral daily  clopidogrel Tablet 75 milliGRAM(s) Oral daily  heparin  Injectable 5000 Unit(s) SubCutaneous every 8 hours  hydroxychloroquine   Oral   hydroxychloroquine 200 milliGRAM(s) Oral every 12 hours  insulin lispro (HumaLOG) corrective regimen sliding scale   SubCutaneous three times a day before meals  insulin lispro (HumaLOG) corrective regimen sliding scale   SubCutaneous at bedtime  methylPREDNISolone sodium succinate Injectable 40 milliGRAM(s) IV Push every 12 hours    MEDICATIONS  (PRN):  acetaminophen   Tablet .. 650 milliGRAM(s) Oral every 6 hours PRN Temp greater or equal to 38C (100.4F), Mild Pain (1 - 3)      Allergies    No Known Allergies    Vital Signs Last 24 Hrs  T(C): 37.1 (10 Apr 2020 11:41), Max: 37.6 (09 Apr 2020 20:42)  T(F): 98.7 (10 Apr 2020 11:41), Max: 99.7 (09 Apr 2020 20:42)  HR: 74 (10 Apr 2020 11:41) (62 - 79)  BP: 100/54 (10 Apr 2020 11:41) (96/65 - 113/65)  BP(mean): 65 (10 Apr 2020 11:41) (65 - 77)  RR: 22 (10 Apr 2020 11:41) (20 - 24)  SpO2: 94% (10 Apr 2020 11:41) (88% - 99%)    General: NAD    LABS:                        11.3   9.77  )-----------( 506      ( 10 Apr 2020 07:12 )             34.5     04-10    137  |  102  |  17  ----------------------------<  208<H>  5.2   |  27  |  0.69    Ca    9.5      10 Apr 2020 07:12  Mg     3.2     04-10    TPro  7.6  /  Alb  2.4<L>  /  TBili  0.5  /  DBili  x   /  AST  35  /  ALT  76<H>  /  AlkPhos  119  04-10          RADIOLOGY & ADDITIONAL TESTS:  < from: Xray Chest 1 View- PORTABLE-Urgent (04.06.20 @ 19:37) >  EXAM:  XR CHEST PORTABLE URGENT 1V                            PROCEDURE DATE:  04/06/2020          INTERPRETATION:  Portable chest radiograph        CLINICAL INFORMATION:   Short of breath. Fever and cough    TECHNIQUE:  Portable  AP view of the chest was obtained.    COMPARISON: No previous examinations are available for review.    FINDINGS:   The lungs  show bilateral lung peripheral lung scattered airspace consolidations are concerning for infectious pneumonia. There are basilar airspace consolidation obscuring the diaphragmatic contours.    The heart and mediastinum are within normal limits.    Visualized osseous structures are intact.        IMPRESSION:   Bilateral lung airspace consolidations concerning for infectious bacterial or viral pneumonia..    TESSIE MONTANEZ   This document has been electronically signed. Apr 6 2020  7:50PM           < end of copied text >

## 2020-04-11 LAB
ALBUMIN SERPL ELPH-MCNC: 2.4 G/DL — LOW (ref 3.5–5)
ALP SERPL-CCNC: 123 U/L — HIGH (ref 40–120)
ALT FLD-CCNC: 163 U/L DA — HIGH (ref 10–60)
ANION GAP SERPL CALC-SCNC: 6 MMOL/L — SIGNIFICANT CHANGE UP (ref 5–17)
AST SERPL-CCNC: 75 U/L — HIGH (ref 10–40)
BILIRUB SERPL-MCNC: 0.4 MG/DL — SIGNIFICANT CHANGE UP (ref 0.2–1.2)
BUN SERPL-MCNC: 23 MG/DL — HIGH (ref 7–18)
CALCIUM SERPL-MCNC: 8.9 MG/DL — SIGNIFICANT CHANGE UP (ref 8.4–10.5)
CHLORIDE SERPL-SCNC: 102 MMOL/L — SIGNIFICANT CHANGE UP (ref 96–108)
CO2 SERPL-SCNC: 29 MMOL/L — SIGNIFICANT CHANGE UP (ref 22–31)
CREAT SERPL-MCNC: 0.75 MG/DL — SIGNIFICANT CHANGE UP (ref 0.5–1.3)
GLUCOSE BLDC GLUCOMTR-MCNC: 236 MG/DL — HIGH (ref 70–99)
GLUCOSE BLDC GLUCOMTR-MCNC: 261 MG/DL — HIGH (ref 70–99)
GLUCOSE BLDC GLUCOMTR-MCNC: 280 MG/DL — HIGH (ref 70–99)
GLUCOSE BLDC GLUCOMTR-MCNC: 288 MG/DL — HIGH (ref 70–99)
GLUCOSE BLDC GLUCOMTR-MCNC: 291 MG/DL — HIGH (ref 70–99)
GLUCOSE SERPL-MCNC: 248 MG/DL — HIGH (ref 70–99)
HCT VFR BLD CALC: 33.2 % — LOW (ref 39–50)
HGB BLD-MCNC: 10.7 G/DL — LOW (ref 13–17)
MCHC RBC-ENTMCNC: 27.4 PG — SIGNIFICANT CHANGE UP (ref 27–34)
MCHC RBC-ENTMCNC: 32.2 GM/DL — SIGNIFICANT CHANGE UP (ref 32–36)
MCV RBC AUTO: 85.1 FL — SIGNIFICANT CHANGE UP (ref 80–100)
NRBC # BLD: 0 /100 WBCS — SIGNIFICANT CHANGE UP (ref 0–0)
PLATELET # BLD AUTO: 604 K/UL — HIGH (ref 150–400)
POTASSIUM SERPL-MCNC: 5 MMOL/L — SIGNIFICANT CHANGE UP (ref 3.5–5.3)
POTASSIUM SERPL-SCNC: 5 MMOL/L — SIGNIFICANT CHANGE UP (ref 3.5–5.3)
PROT SERPL-MCNC: 7.2 G/DL — SIGNIFICANT CHANGE UP (ref 6–8.3)
RBC # BLD: 3.9 M/UL — LOW (ref 4.2–5.8)
RBC # FLD: 11.9 % — SIGNIFICANT CHANGE UP (ref 10.3–14.5)
SODIUM SERPL-SCNC: 137 MMOL/L — SIGNIFICANT CHANGE UP (ref 135–145)
WBC # BLD: 16.42 K/UL — HIGH (ref 3.8–10.5)
WBC # FLD AUTO: 16.42 K/UL — HIGH (ref 3.8–10.5)

## 2020-04-11 PROCEDURE — 99232 SBSQ HOSP IP/OBS MODERATE 35: CPT

## 2020-04-11 RX ORDER — INSULIN GLARGINE 100 [IU]/ML
6 INJECTION, SOLUTION SUBCUTANEOUS AT BEDTIME
Refills: 0 | Status: DISCONTINUED | OUTPATIENT
Start: 2020-04-11 | End: 2020-04-12

## 2020-04-11 RX ORDER — INSULIN LISPRO 100/ML
3 VIAL (ML) SUBCUTANEOUS
Refills: 0 | Status: COMPLETED | OUTPATIENT
Start: 2020-04-11 | End: 2020-04-13

## 2020-04-11 RX ADMIN — Medication 40 MILLIGRAM(S): at 12:22

## 2020-04-11 RX ADMIN — Medication 3: at 17:22

## 2020-04-11 RX ADMIN — CLOPIDOGREL BISULFATE 75 MILLIGRAM(S): 75 TABLET, FILM COATED ORAL at 12:22

## 2020-04-11 RX ADMIN — Medication 100 MILLIGRAM(S): at 05:06

## 2020-04-11 RX ADMIN — Medication 1000 UNIT(S): at 12:22

## 2020-04-11 RX ADMIN — Medication 40 MILLIGRAM(S): at 21:34

## 2020-04-11 RX ADMIN — Medication 2: at 08:40

## 2020-04-11 RX ADMIN — HEPARIN SODIUM 5000 UNIT(S): 5000 INJECTION INTRAVENOUS; SUBCUTANEOUS at 05:06

## 2020-04-11 RX ADMIN — Medication 100 MILLIGRAM(S): at 16:09

## 2020-04-11 RX ADMIN — Medication 100 MILLIGRAM(S): at 21:34

## 2020-04-11 RX ADMIN — Medication 3: at 12:21

## 2020-04-11 RX ADMIN — HEPARIN SODIUM 5000 UNIT(S): 5000 INJECTION INTRAVENOUS; SUBCUTANEOUS at 21:34

## 2020-04-11 RX ADMIN — Medication 200 MILLIGRAM(S): at 12:21

## 2020-04-11 RX ADMIN — HEPARIN SODIUM 5000 UNIT(S): 5000 INJECTION INTRAVENOUS; SUBCUTANEOUS at 16:09

## 2020-04-11 RX ADMIN — Medication 81 MILLIGRAM(S): at 12:40

## 2020-04-11 RX ADMIN — INSULIN GLARGINE 6 UNIT(S): 100 INJECTION, SOLUTION SUBCUTANEOUS at 22:45

## 2020-04-11 NOTE — PROGRESS NOTE ADULT - SUBJECTIVE AND OBJECTIVE BOX
Medicine Progress Note    Patient is a 55y old  Male who presents with a chief complaint of Shortness of breath (10 Apr 2020 16:34)      SUBJECTIVE / OVERNIGHT EVENTS:    ADDITIONAL REVIEW OF SYSTEMS:    MEDICATIONS  (STANDING):  aspirin enteric coated 81 milliGRAM(s) Oral daily  benzonatate 100 milliGRAM(s) Oral every 8 hours  cholecalciferol 1000 Unit(s) Oral daily  clopidogrel Tablet 75 milliGRAM(s) Oral daily  heparin  Injectable 5000 Unit(s) SubCutaneous every 8 hours  insulin lispro (HumaLOG) corrective regimen sliding scale   SubCutaneous three times a day before meals  insulin lispro (HumaLOG) corrective regimen sliding scale   SubCutaneous at bedtime  methylPREDNISolone sodium succinate Injectable 40 milliGRAM(s) IV Push every 12 hours    MEDICATIONS  (PRN):  acetaminophen   Tablet .. 650 milliGRAM(s) Oral every 6 hours PRN Temp greater or equal to 38C (100.4F), Mild Pain (1 - 3)    CAPILLARY BLOOD GLUCOSE      POCT Blood Glucose.: 291 mg/dL (11 Apr 2020 17:04)  POCT Blood Glucose.: 280 mg/dL (11 Apr 2020 11:58)  POCT Blood Glucose.: 236 mg/dL (11 Apr 2020 08:33)  POCT Blood Glucose.: 248 mg/dL (10 Apr 2020 21:00)    I&O's Summary      PHYSICAL EXAM:  Vital Signs Last 24 Hrs  T(C): 37.1 (11 Apr 2020 15:59), Max: 37.2 (10 Apr 2020 21:06)  T(F): 98.7 (11 Apr 2020 15:59), Max: 99 (10 Apr 2020 21:06)  HR: 62 (11 Apr 2020 15:59) (62 - 76)  BP: 120/66 (11 Apr 2020 15:59) (102/57 - 120/66)  BP(mean): 79 (11 Apr 2020 15:59) (79 - 79)  RR: 22 (11 Apr 2020 15:59) (20 - 22)  SpO2: 96% (11 Apr 2020 15:59) (95% - 100%)  CONSTITUTIONAL: NAD, well-developed, well-groomed  ENMT: Moist oral mucosa, no pharyngeal injection or exudates; normal dentition  RESPIRATORY: Normal respiratory effort; lungs are clear to auscultation bilaterally  CARDIOVASCULAR: Regular rate and rhythm, normal S1 and S2, no murmur/rub/gallop; No lower extremity edema; Peripheral pulses are 2+ bilaterally  ABDOMEN: Nontender to palpation, normoactive bowel sounds, no rebound/guarding; No hepatosplenomegaly  PSYCH: A+O to person, place, and time; affect appropriate  NEUROLOGY: CN 2-12 are intact and symmetric; no gross sensory deficits   SKIN: No rashes; no palpable lesions    LABS:                        10.7   16.42 )-----------( 604      ( 11 Apr 2020 07:13 )             33.2     04-11    137  |  102  |  23<H>  ----------------------------<  248<H>  5.0   |  29  |  0.75    Ca    8.9      11 Apr 2020 07:13  Mg     3.2     04-10    TPro  7.2  /  Alb  2.4<L>  /  TBili  0.4  /  DBili  x   /  AST  75<H>  /  ALT  163<H>  /  AlkPhos  123<H>  04-11              COVID-19 PCR: Positive for 2019-nCoV NOTE: The COVID-19 assay has been cleared by the U.S. Food and Drug  Administration under the Emergency Use Authorization (EUA). [facility] is designated as a high complexity laboratory by the Clinical  Laboratory Improvement Amendments of 1988(CLIA) and is qualified to  perform  this test. ASSAY INFORMATION: Real Time RT-PCR  Performed By:  [facility], [employer] ([employer]  [address] [place] [address]  Abhi Pierce M.D. (06 Apr 2020 19:57)      RADIOLOGY & ADDITIONAL TESTS:  Imaging from Last 24 Hours:    Electrocardiogram/QTc Interval:    COORDINATION OF CARE:  Care Discussed with Consultants/Other Providers: Medicine Progress Note    Patient is a 55y old  Male who presents with a chief complaint of Shortness of breath (10 Apr 2020 16:34)      SUBJECTIVE / OVERNIGHT EVENTS:  Pt reports feeling much better, coughing less. Pt has been proning himself, when prone SaO2 99% on 15L NC, when supine SaO2 95%.       MEDICATIONS  (STANDING):  aspirin enteric coated 81 milliGRAM(s) Oral daily  benzonatate 100 milliGRAM(s) Oral every 8 hours  cholecalciferol 1000 Unit(s) Oral daily  clopidogrel Tablet 75 milliGRAM(s) Oral daily  heparin  Injectable 5000 Unit(s) SubCutaneous every 8 hours  insulin lispro (HumaLOG) corrective regimen sliding scale   SubCutaneous three times a day before meals  insulin lispro (HumaLOG) corrective regimen sliding scale   SubCutaneous at bedtime  methylPREDNISolone sodium succinate Injectable 40 milliGRAM(s) IV Push every 12 hours    MEDICATIONS  (PRN):  acetaminophen   Tablet .. 650 milliGRAM(s) Oral every 6 hours PRN Temp greater or equal to 38C (100.4F), Mild Pain (1 - 3)      POCT Blood Glucose.: 291 mg/dL (11 Apr 2020 17:04)  POCT Blood Glucose.: 280 mg/dL (11 Apr 2020 11:58)  POCT Blood Glucose.: 236 mg/dL (11 Apr 2020 08:33)  POCT Blood Glucose.: 248 mg/dL (10 Apr 2020 21:00)    PHYSICAL EXAM:  Vital Signs Last 24 Hrs  T(C): 37.1 (11 Apr 2020 15:59), Max: 37.2 (10 Apr 2020 21:06)  T(F): 98.7 (11 Apr 2020 15:59), Max: 99 (10 Apr 2020 21:06)  HR: 62 (11 Apr 2020 15:59) (62 - 76)  BP: 120/66 (11 Apr 2020 15:59) (102/57 - 120/66)  BP(mean): 79 (11 Apr 2020 15:59) (79 - 79)  RR: 22 (11 Apr 2020 15:59) (20 - 22)  SpO2: 96% (11 Apr 2020 15:59) (95% - 100%)  CONSTITUTIONAL: NAD  Pulm: bibasilar crackles    LABS:                        10.7   16.42 )-----------( 604      ( 11 Apr 2020 07:13 )             33.2     04-11    137  |  102  |  23<H>  ----------------------------<  248<H>  5.0   |  29  |  0.75    Ca    8.9      11 Apr 2020 07:13  Mg     3.2     04-10    TPro  7.2  /  Alb  2.4<L>  /  TBili  0.4  /  DBili  x   /  AST  75<H>  /  ALT  163<H>  /  AlkPhos  123<H>  04-11

## 2020-04-11 NOTE — PROGRESS NOTE ADULT - ASSESSMENT
Plan:  1) Acute hypoxemic respiratory failure due to COVID-19 related pneumonia, still requiring high dosing O2 on NRM with improvement of SaO2 from 91% to 95%  -cw solumedrol 40mg Q12hr x 3 days started 4/9-  -obtain markers: CRP, ferritin, D-dimer. If qualifying and if patient requiring > NRB 6L to keep SaO2 > 92%, start anakinra 100mg q12 x 3 days  -cw HCQ 4/7- to complete 5 days. QTc 466. Monitor K>4, Mg>2  -low threshold for ICU    2) DVT ppx  -Lovenox sq. Improve score 2    Dr. Aliya White  [phone]  [email or web address] . Plan:  1) Acute hypoxemic respiratory failure due to COVID-19 related pneumonia, still requiring high dosing O2 on NRM with improvement of SaO2 from 91% to 95%  -cw solumedrol 40mg Q12hr x 3 days started 4/9-  -obtain markers: CRP, ferritin, D-dimer. If qualifying and if patient requiring > NRB 6L to keep SaO2 > 92%, start anakinra 100mg q12 x 3 days  -Also, if D-dimer is very high, might increase Lovenox to bid  -cw HCQ 4/7- to complete 5 days. QTc 466. Monitor K>4, Mg>2  -low threshold for ICU    2) DVT ppx  -Lovenox sq. Improve score 2    Dr. Aliya White  [phone]  [email or web address] . Plan:  1) Acute hypoxemic respiratory failure due to COVID-19 related pneumonia, still requiring high dosing O2 on NRM with improvement of SaO2 from 91% to 95%  -cw solumedrol 40mg Q12hr x 3 days started 4/9-  -obtain markers: CRP, ferritin, D-dimer. If qualifying and if patient requiring > NRB 6L to keep SaO2 > 92%, start anakinra 100mg q12 x 3 days  -Also, if D-dimer is very high, might increase Lovenox to bid  -cw HCQ 4/7- to complete 5 days. QTc 466. Monitor K>4, Mg>2  -low threshold for ICU    2) Hyperglycemia from steroids in pt with DMII  -A1C 7.8%   -no basal coverage: add Lantus 6U, lispro 6U tid. Uptitrate as needed, pt has 1 more day of Solumedrol    3) DVT ppx  -Lovenox sq. Improve score 2    Dr. Aliya White  [phone]  [email or web address] .

## 2020-04-12 LAB
ALBUMIN SERPL ELPH-MCNC: 2.7 G/DL — LOW (ref 3.5–5)
ALP SERPL-CCNC: 124 U/L — HIGH (ref 40–120)
ALT FLD-CCNC: 151 U/L DA — HIGH (ref 10–60)
ANION GAP SERPL CALC-SCNC: 4 MMOL/L — LOW (ref 5–17)
AST SERPL-CCNC: 35 U/L — SIGNIFICANT CHANGE UP (ref 10–40)
BASOPHILS # BLD AUTO: 0 K/UL — SIGNIFICANT CHANGE UP (ref 0–0.2)
BASOPHILS NFR BLD AUTO: 0 % — SIGNIFICANT CHANGE UP (ref 0–2)
BILIRUB SERPL-MCNC: 0.5 MG/DL — SIGNIFICANT CHANGE UP (ref 0.2–1.2)
BUN SERPL-MCNC: 23 MG/DL — HIGH (ref 7–18)
CALCIUM SERPL-MCNC: 9.5 MG/DL — SIGNIFICANT CHANGE UP (ref 8.4–10.5)
CHLORIDE SERPL-SCNC: 102 MMOL/L — SIGNIFICANT CHANGE UP (ref 96–108)
CO2 SERPL-SCNC: 31 MMOL/L — SIGNIFICANT CHANGE UP (ref 22–31)
CREAT SERPL-MCNC: 0.78 MG/DL — SIGNIFICANT CHANGE UP (ref 0.5–1.3)
CRP SERPL-MCNC: 2.8 MG/DL — HIGH (ref 0–0.4)
D DIMER BLD IA.RAPID-MCNC: 325 NG/ML DDU — HIGH
EOSINOPHIL # BLD AUTO: 0 K/UL — SIGNIFICANT CHANGE UP (ref 0–0.5)
EOSINOPHIL NFR BLD AUTO: 0 % — SIGNIFICANT CHANGE UP (ref 0–6)
FERRITIN SERPL-MCNC: 680 NG/ML — HIGH (ref 30–400)
GLUCOSE BLDC GLUCOMTR-MCNC: 225 MG/DL — HIGH (ref 70–99)
GLUCOSE BLDC GLUCOMTR-MCNC: 249 MG/DL — HIGH (ref 70–99)
GLUCOSE BLDC GLUCOMTR-MCNC: 281 MG/DL — HIGH (ref 70–99)
GLUCOSE BLDC GLUCOMTR-MCNC: 328 MG/DL — HIGH (ref 70–99)
GLUCOSE SERPL-MCNC: 282 MG/DL — HIGH (ref 70–99)
HCT VFR BLD CALC: 36.1 % — LOW (ref 39–50)
HGB BLD-MCNC: 11.6 G/DL — LOW (ref 13–17)
LDH SERPL L TO P-CCNC: 237 U/L — HIGH (ref 120–225)
LYMPHOCYTES # BLD AUTO: 1.08 K/UL — SIGNIFICANT CHANGE UP (ref 1–3.3)
LYMPHOCYTES # BLD AUTO: 8 % — LOW (ref 13–44)
MANUAL SMEAR VERIFICATION: SIGNIFICANT CHANGE UP
MCHC RBC-ENTMCNC: 27.4 PG — SIGNIFICANT CHANGE UP (ref 27–34)
MCHC RBC-ENTMCNC: 32.1 GM/DL — SIGNIFICANT CHANGE UP (ref 32–36)
MCV RBC AUTO: 85.1 FL — SIGNIFICANT CHANGE UP (ref 80–100)
MONOCYTES # BLD AUTO: 0.4 K/UL — SIGNIFICANT CHANGE UP (ref 0–0.9)
MONOCYTES NFR BLD AUTO: 3 % — SIGNIFICANT CHANGE UP (ref 2–14)
MYELOCYTES NFR BLD: 1 % — HIGH (ref 0–0)
NEUTROPHILS # BLD AUTO: 11.84 K/UL — HIGH (ref 1.8–7.4)
NEUTROPHILS NFR BLD AUTO: 88 % — HIGH (ref 43–77)
NRBC # BLD: 0 /100 — SIGNIFICANT CHANGE UP (ref 0–0)
OVALOCYTES BLD QL SMEAR: SLIGHT — SIGNIFICANT CHANGE UP
PLAT MORPH BLD: NORMAL — SIGNIFICANT CHANGE UP
PLATELET # BLD AUTO: 645 K/UL — HIGH (ref 150–400)
POTASSIUM SERPL-MCNC: 5.8 MMOL/L — HIGH (ref 3.5–5.3)
POTASSIUM SERPL-SCNC: 5.8 MMOL/L — HIGH (ref 3.5–5.3)
PROT SERPL-MCNC: 7.6 G/DL — SIGNIFICANT CHANGE UP (ref 6–8.3)
RBC # BLD: 4.24 M/UL — SIGNIFICANT CHANGE UP (ref 4.2–5.8)
RBC # FLD: 11.9 % — SIGNIFICANT CHANGE UP (ref 10.3–14.5)
RBC BLD AUTO: ABNORMAL
SODIUM SERPL-SCNC: 137 MMOL/L — SIGNIFICANT CHANGE UP (ref 135–145)
WBC # BLD: 13.45 K/UL — HIGH (ref 3.8–10.5)
WBC # FLD AUTO: 13.45 K/UL — HIGH (ref 3.8–10.5)

## 2020-04-12 PROCEDURE — 99232 SBSQ HOSP IP/OBS MODERATE 35: CPT | Mod: GC

## 2020-04-12 RX ORDER — SODIUM POLYSTYRENE SULFONATE 4.1 MEQ/G
30 POWDER, FOR SUSPENSION ORAL ONCE
Refills: 0 | Status: COMPLETED | OUTPATIENT
Start: 2020-04-12 | End: 2020-04-12

## 2020-04-12 RX ORDER — INSULIN GLARGINE 100 [IU]/ML
10 INJECTION, SOLUTION SUBCUTANEOUS AT BEDTIME
Refills: 0 | Status: DISCONTINUED | OUTPATIENT
Start: 2020-04-12 | End: 2020-04-14

## 2020-04-12 RX ADMIN — Medication 2: at 18:23

## 2020-04-12 RX ADMIN — Medication 3 UNIT(S): at 13:29

## 2020-04-12 RX ADMIN — Medication 2: at 09:30

## 2020-04-12 RX ADMIN — Medication 100 MILLIGRAM(S): at 05:29

## 2020-04-12 RX ADMIN — Medication 81 MILLIGRAM(S): at 11:48

## 2020-04-12 RX ADMIN — CLOPIDOGREL BISULFATE 75 MILLIGRAM(S): 75 TABLET, FILM COATED ORAL at 11:50

## 2020-04-12 RX ADMIN — Medication 3 UNIT(S): at 18:24

## 2020-04-12 RX ADMIN — HEPARIN SODIUM 5000 UNIT(S): 5000 INJECTION INTRAVENOUS; SUBCUTANEOUS at 05:29

## 2020-04-12 RX ADMIN — HEPARIN SODIUM 5000 UNIT(S): 5000 INJECTION INTRAVENOUS; SUBCUTANEOUS at 12:12

## 2020-04-12 RX ADMIN — Medication 4: at 13:28

## 2020-04-12 RX ADMIN — SODIUM POLYSTYRENE SULFONATE 30 GRAM(S): 4.1 POWDER, FOR SUSPENSION ORAL at 12:09

## 2020-04-12 RX ADMIN — Medication 40 MILLIGRAM(S): at 11:47

## 2020-04-12 RX ADMIN — HEPARIN SODIUM 5000 UNIT(S): 5000 INJECTION INTRAVENOUS; SUBCUTANEOUS at 22:12

## 2020-04-12 RX ADMIN — Medication 3 UNIT(S): at 09:27

## 2020-04-12 RX ADMIN — Medication 1000 UNIT(S): at 11:49

## 2020-04-12 RX ADMIN — INSULIN GLARGINE 10 UNIT(S): 100 INJECTION, SOLUTION SUBCUTANEOUS at 22:12

## 2020-04-12 NOTE — DIETITIAN INITIAL EVALUATION ADULT. - PROBLEM SELECTOR PLAN 3
Pt is on metformin at home  Holding at present  Will start on sliding scale for now   Monitor finger stick closely\  f/u hba1c

## 2020-04-12 NOTE — PROGRESS NOTE ADULT - SUBJECTIVE AND OBJECTIVE BOX
MEDICAL ATTENDING NOTE  Patient is a 55y old  Male who presents with a chief complaint of Shortness of breath (11 Apr 2020 18:52)      HPI:  Pt is a 54 yo male with pmhx of DM, HLD, 2 prev cardiac stents, lifetime non-smoker who presented to ED with c/o cough, fever, cold for few days. Pt Endorses fever, and generalized malaise.  In Ed pt was noted to be hypoxic RA and was placed on non-rebreather with improvement in saturation.   In ED, Pt vitals were  T(F): 98.1   HR: 84   BP: 111/67   RR: 20   SpO2: 100%   GOC: Full code (07 Apr 2020 06:04)      INTERVAL HPI/OVERNIGHT EVENTS: feeling slightly better.     MEDICATIONS  (STANDING):  aspirin enteric coated 81 milliGRAM(s) Oral daily  cholecalciferol 1000 Unit(s) Oral daily  clopidogrel Tablet 75 milliGRAM(s) Oral daily  heparin  Injectable 5000 Unit(s) SubCutaneous every 8 hours  insulin glargine Injectable (LANTUS) 6 Unit(s) SubCutaneous at bedtime  insulin lispro (HumaLOG) corrective regimen sliding scale   SubCutaneous three times a day before meals  insulin lispro Injectable (HumaLOG) 3 Unit(s) SubCutaneous three times a day before meals    MEDICATIONS  (PRN):  acetaminophen   Tablet .. 650 milliGRAM(s) Oral every 6 hours PRN Temp greater or equal to 38C (100.4F), Mild Pain (1 - 3)      __________________________________________________  REVIEW OF SYSTEMS:    CONSTITUTIONAL: No fever,   Resp:  less SOB      Vital Signs Last 24 Hrs  T(C): 37.1 (12 Apr 2020 16:06), Max: 37.1 (11 Apr 2020 20:21)  T(F): 98.8 (12 Apr 2020 16:06), Max: 98.8 (11 Apr 2020 20:21)  HR: 68 (12 Apr 2020 16:06) (64 - 79)  BP: 132/67 (12 Apr 2020 16:06) (95/67 - 132/67)  BP(mean): 86 (12 Apr 2020 00:29) (66 - 86)  RR: 19 (12 Apr 2020 16:06) (19 - 20)  SpO2: 91% (12 Apr 2020 16:06) (91% - 100%)    ________________________________________________  PHYSICAL EXAM:  GENERAL: NAD, on Nasal cannula  HEENT: Normocephalic;  conjunctivae and sclerae clear; moist mucous membranes;   NECK : supple  CHEST/LUNG: bilateral air entry  HEART: S1 S2  regular; no murmurs, gallops or rubs  ABDOMEN: Soft, Nontender, Nondistended; Bowel sounds present  EXTREMITIES: no cyanosis; no edema; no calf tenderness  SKIN: warm and dry; no rash  NERVOUS SYSTEM:  Awake and alert; Oriented  to place, person and time ; no new deficits    _________________________________________________  LABS:                        11.6   13.45 )-----------( 645      ( 12 Apr 2020 05:55 )             36.1     04-12    137  |  102  |  23<H>  ----------------------------<  282<H>  5.8<H>   |  31  |  0.78    Ca    9.5      12 Apr 2020 05:55    TPro  7.6  /  Alb  2.7<L>  /  TBili  0.5  /  DBili  x   /  AST  35  /  ALT  151<H>  /  AlkPhos  124<H>  04-12        CAPILLARY BLOOD GLUCOSE      POCT Blood Glucose.: 225 mg/dL (12 Apr 2020 16:49)  POCT Blood Glucose.: 328 mg/dL (12 Apr 2020 12:47)  POCT Blood Glucose.: 249 mg/dL (12 Apr 2020 08:15)  POCT Blood Glucose.: 261 mg/dL (11 Apr 2020 22:33)  POCT Blood Glucose.: 288 mg/dL (11 Apr 2020 20:52)

## 2020-04-12 NOTE — DIETITIAN INITIAL EVALUATION ADULT. - OTHER INFO
PT W COVID +B /L PNA . Pt is on Airborne isolation.  D/W RN. Pt was weaned from 100 % NRM. Pt is on Diabetic Full liquid Diet. PO tolerated plan is for advance diet. Pt w H/o DM A1c 7.8.

## 2020-04-12 NOTE — DIETITIAN INITIAL EVALUATION ADULT. - DIET TYPE
DIABETIC FULL LIQUID DIET low sodium/consistent carbohydrate (evening snack)/no concentrated potassium

## 2020-04-12 NOTE — PROGRESS NOTE ADULT - ASSESSMENT
Plan:  1) Acute hypoxemic respiratory failure due to COVID-19 related pneumonia, still requiring high dosing O2 on nasal cannula with improvement of SaO2 from 91% to 95%  -cw solumedrol 40mg Q12hr x 3 days started 4/9-  -obtain markers: CRP, ferritin, D-dimer. If qualifying and if patient requiring > NRB 6L to keep SaO2 > 92%, start anakinra 100mg q12 x 3 days  -Also, if D-dimer is very high, might increase Lovenox to bid  -cw HCQ 4/7- to complete 5 days. QTc 466. Monitor K>4, Mg>2  -low threshold for ICU    2) Hyperglycemia from steroids in pt with DMII  -A1C 7.8%   -no basal coverage: will increase dose of lantus today;  lispro 6U tid. Uptitrate as needed, pt has 1 more day of Solumedrol    3) Hyperkalemia is likely spurious and may be attributable to thrombocytosis.  Patient received kayexalate.  Repeat in AM.  Plasma potassium in AM.     4) DVT ppx  -Lovenox sq. Improve score 2

## 2020-04-13 DIAGNOSIS — E11.9 TYPE 2 DIABETES MELLITUS WITHOUT COMPLICATIONS: ICD-10-CM

## 2020-04-13 DIAGNOSIS — U07.1 COVID-19: ICD-10-CM

## 2020-04-13 DIAGNOSIS — Z71.89 OTHER SPECIFIED COUNSELING: ICD-10-CM

## 2020-04-13 DIAGNOSIS — J96.01 ACUTE RESPIRATORY FAILURE WITH HYPOXIA: ICD-10-CM

## 2020-04-13 DIAGNOSIS — Z29.9 ENCOUNTER FOR PROPHYLACTIC MEASURES, UNSPECIFIED: ICD-10-CM

## 2020-04-13 LAB
ALBUMIN SERPL ELPH-MCNC: 2.7 G/DL — LOW (ref 3.5–5)
ALP SERPL-CCNC: 111 U/L — SIGNIFICANT CHANGE UP (ref 40–120)
ALT FLD-CCNC: 106 U/L DA — HIGH (ref 10–60)
ANION GAP SERPL CALC-SCNC: 8 MMOL/L — SIGNIFICANT CHANGE UP (ref 5–17)
AST SERPL-CCNC: 16 U/L — SIGNIFICANT CHANGE UP (ref 10–40)
BASOPHILS # BLD AUTO: 0.01 K/UL — SIGNIFICANT CHANGE UP (ref 0–0.2)
BASOPHILS NFR BLD AUTO: 0.1 % — SIGNIFICANT CHANGE UP (ref 0–2)
BILIRUB SERPL-MCNC: 0.5 MG/DL — SIGNIFICANT CHANGE UP (ref 0.2–1.2)
BUN SERPL-MCNC: 21 MG/DL — HIGH (ref 7–18)
CALCIUM SERPL-MCNC: 8.9 MG/DL — SIGNIFICANT CHANGE UP (ref 8.4–10.5)
CHLORIDE SERPL-SCNC: 98 MMOL/L — SIGNIFICANT CHANGE UP (ref 96–108)
CK SERPL-CCNC: 52 U/L — SIGNIFICANT CHANGE UP (ref 35–232)
CO2 SERPL-SCNC: 29 MMOL/L — SIGNIFICANT CHANGE UP (ref 22–31)
CREAT SERPL-MCNC: 0.91 MG/DL — SIGNIFICANT CHANGE UP (ref 0.5–1.3)
EOSINOPHIL # BLD AUTO: 0.01 K/UL — SIGNIFICANT CHANGE UP (ref 0–0.5)
EOSINOPHIL NFR BLD AUTO: 0.1 % — SIGNIFICANT CHANGE UP (ref 0–6)
GLUCOSE BLDC GLUCOMTR-MCNC: 146 MG/DL — HIGH (ref 70–99)
GLUCOSE BLDC GLUCOMTR-MCNC: 158 MG/DL — HIGH (ref 70–99)
GLUCOSE BLDC GLUCOMTR-MCNC: 180 MG/DL — HIGH (ref 70–99)
GLUCOSE BLDC GLUCOMTR-MCNC: 68 MG/DL — LOW (ref 70–99)
GLUCOSE SERPL-MCNC: 181 MG/DL — HIGH (ref 70–99)
HCT VFR BLD CALC: 38.6 % — LOW (ref 39–50)
HGB BLD-MCNC: 12.5 G/DL — LOW (ref 13–17)
IMM GRANULOCYTES NFR BLD AUTO: 2.6 % — HIGH (ref 0–1.5)
LYMPHOCYTES # BLD AUTO: 18.8 % — SIGNIFICANT CHANGE UP (ref 13–44)
LYMPHOCYTES # BLD AUTO: 2.45 K/UL — SIGNIFICANT CHANGE UP (ref 1–3.3)
MAGNESIUM SERPL-MCNC: 2.8 MG/DL — HIGH (ref 1.6–2.6)
MCHC RBC-ENTMCNC: 27.5 PG — SIGNIFICANT CHANGE UP (ref 27–34)
MCHC RBC-ENTMCNC: 32.4 GM/DL — SIGNIFICANT CHANGE UP (ref 32–36)
MCV RBC AUTO: 84.8 FL — SIGNIFICANT CHANGE UP (ref 80–100)
MONOCYTES # BLD AUTO: 0.89 K/UL — SIGNIFICANT CHANGE UP (ref 0–0.9)
MONOCYTES NFR BLD AUTO: 6.8 % — SIGNIFICANT CHANGE UP (ref 2–14)
NEUTROPHILS # BLD AUTO: 9.33 K/UL — HIGH (ref 1.8–7.4)
NEUTROPHILS NFR BLD AUTO: 71.6 % — SIGNIFICANT CHANGE UP (ref 43–77)
NRBC # BLD: 0 /100 WBCS — SIGNIFICANT CHANGE UP (ref 0–0)
PHOSPHATE SERPL-MCNC: 3.2 MG/DL — SIGNIFICANT CHANGE UP (ref 2.5–4.5)
PLATELET # BLD AUTO: 592 K/UL — HIGH (ref 150–400)
POTASSIUM SERPL-MCNC: 3.6 MMOL/L — SIGNIFICANT CHANGE UP (ref 3.5–5.3)
POTASSIUM SERPL-SCNC: 3.6 MMOL/L — SIGNIFICANT CHANGE UP (ref 3.5–5.3)
PROT SERPL-MCNC: 7.3 G/DL — SIGNIFICANT CHANGE UP (ref 6–8.3)
RBC # BLD: 4.55 M/UL — SIGNIFICANT CHANGE UP (ref 4.2–5.8)
RBC # FLD: 11.8 % — SIGNIFICANT CHANGE UP (ref 10.3–14.5)
SODIUM SERPL-SCNC: 135 MMOL/L — SIGNIFICANT CHANGE UP (ref 135–145)
WBC # BLD: 13.03 K/UL — HIGH (ref 3.8–10.5)
WBC # FLD AUTO: 13.03 K/UL — HIGH (ref 3.8–10.5)

## 2020-04-13 PROCEDURE — 99233 SBSQ HOSP IP/OBS HIGH 50: CPT | Mod: GC

## 2020-04-13 RX ORDER — ACETAMINOPHEN 500 MG
2 TABLET ORAL
Qty: 0 | Refills: 0 | DISCHARGE
Start: 2020-04-13

## 2020-04-13 RX ORDER — CHOLECALCIFEROL (VITAMIN D3) 125 MCG
1000 CAPSULE ORAL
Qty: 0 | Refills: 0 | DISCHARGE
Start: 2020-04-13

## 2020-04-13 RX ORDER — CEFDINIR 250 MG/5ML
5 POWDER, FOR SUSPENSION ORAL
Qty: 0 | Refills: 0 | DISCHARGE

## 2020-04-13 RX ADMIN — HEPARIN SODIUM 5000 UNIT(S): 5000 INJECTION INTRAVENOUS; SUBCUTANEOUS at 13:00

## 2020-04-13 RX ADMIN — HEPARIN SODIUM 5000 UNIT(S): 5000 INJECTION INTRAVENOUS; SUBCUTANEOUS at 21:46

## 2020-04-13 RX ADMIN — Medication 3 UNIT(S): at 12:58

## 2020-04-13 RX ADMIN — CLOPIDOGREL BISULFATE 75 MILLIGRAM(S): 75 TABLET, FILM COATED ORAL at 12:57

## 2020-04-13 RX ADMIN — HEPARIN SODIUM 5000 UNIT(S): 5000 INJECTION INTRAVENOUS; SUBCUTANEOUS at 05:18

## 2020-04-13 RX ADMIN — Medication 1: at 08:33

## 2020-04-13 RX ADMIN — INSULIN GLARGINE 10 UNIT(S): 100 INJECTION, SOLUTION SUBCUTANEOUS at 21:46

## 2020-04-13 RX ADMIN — Medication 1: at 12:58

## 2020-04-13 RX ADMIN — Medication 1000 UNIT(S): at 12:57

## 2020-04-13 RX ADMIN — Medication 3 UNIT(S): at 08:34

## 2020-04-13 RX ADMIN — Medication 81 MILLIGRAM(S): at 12:59

## 2020-04-13 NOTE — PROGRESS NOTE ADULT - PROBLEM SELECTOR PROBLEM 1
Acute hypoxemic respiratory failure
Acute hypoxemic respiratory failure
Pneumonia due to 2019 novel coronavirus
Stented coronary artery

## 2020-04-13 NOTE — PROGRESS NOTE ADULT - SUBJECTIVE AND OBJECTIVE BOX
NP Note discussed with  Primary Attending    Patient is a 55y old  Male who presents with a chief complaint of Shortness of breath (12 Apr 2020 19:56). Pt admitted for acute hypoxemia respiratory failure secondary to COVID pneumonia.       INTERVAL HPI/OVERNIGHT EVENTS: no new complaints, titrated off 02 today. Denies shortness of breath.     MEDICATIONS  (STANDING):  aspirin enteric coated 81 milliGRAM(s) Oral daily  cholecalciferol 1000 Unit(s) Oral daily  clopidogrel Tablet 75 milliGRAM(s) Oral daily  heparin  Injectable 5000 Unit(s) SubCutaneous every 8 hours  insulin glargine Injectable (LANTUS) 10 Unit(s) SubCutaneous at bedtime  insulin lispro (HumaLOG) corrective regimen sliding scale   SubCutaneous three times a day before meals  insulin lispro Injectable (HumaLOG) 3 Unit(s) SubCutaneous three times a day before meals    MEDICATIONS  (PRN):  acetaminophen   Tablet .. 650 milliGRAM(s) Oral every 6 hours PRN Temp greater or equal to 38C (100.4F), Mild Pain (1 - 3)      __________________________________________________  REVIEW OF SYSTEMS:    CONSTITUTIONAL: No fever,   EYES: no acute visual disturbances  NECK: No pain or stiffness  RESPIRATORY: No SOB at rest   CARDIOVASCULAR: No chest pain, no palpitations  GASTROINTESTINAL: No pain. No nausea or vomiting; No diarrhea   NEUROLOGICAL: No headache or numbness, no tremors  MUSCULOSKELETAL: No joint pain, no muscle pain  GENITOURINARY: no dysuria, no frequency, no hesitancy  PSYCHIATRY: no depression , no anxiety  ALL OTHER  ROS negative        Vital Signs Last 24 Hrs  T(C): 36.6 (13 Apr 2020 12:14), Max: 37.1 (12 Apr 2020 16:06)  T(F): 97.8 (13 Apr 2020 12:14), Max: 98.8 (12 Apr 2020 16:06)  HR: 93 (13 Apr 2020 12:14) (65 - 93)  BP: 93/67 (13 Apr 2020 12:14) (92/44 - 132/67)  BP(mean): --  RR: 20 (13 Apr 2020 12:14) (19 - 20)  SpO2: 93% (13 Apr 2020 12:14) (91% - 99%)    ________________________________________________  PHYSICAL EXAM:  GENERAL: NAD  HEENT: Normocephalic;  conjunctivae and sclerae clear; moist mucous membranes;   NECK : supple  CHEST/LUNG: Respirators unlabored at rest   HEART: S1 S2  regular; no murmurs, gallops or rubs  ABDOMEN: Soft, Nondistended;  EXTREMITIES: no cyanosis; no edema;  SKIN: warm and dry  NERVOUS SYSTEM:  Awake and alert; Oriented  to place, person and time ; no new deficits    _________________________________________________  LABS:                        12.5   13.03 )-----------( 592      ( 13 Apr 2020 12:05 )             38.6     04-13    135  |  98  |  21<H>  ----------------------------<  181<H>  3.6   |  29  |  0.91    Ca    8.9      13 Apr 2020 12:05  Phos  3.2     04-13  Mg     2.8     04-13    TPro  7.3  /  Alb  2.7<L>  /  TBili  0.5  /  DBili  x   /  AST  16  /  ALT  106<H>  /  AlkPhos  111  04-13        CAPILLARY BLOOD GLUCOSE      POCT Blood Glucose.: 180 mg/dL (13 Apr 2020 12:07)  POCT Blood Glucose.: 158 mg/dL (13 Apr 2020 07:52)  POCT Blood Glucose.: 281 mg/dL (12 Apr 2020 21:27)  POCT Blood Glucose.: 225 mg/dL (12 Apr 2020 16:49)        RADIOLOGY & ADDITIONAL TESTS:    Imaging  Reviewed:  YES    < from: Xray Chest 1 View- PORTABLE-Urgent (04.06.20 @ 19:37) >  EXAM:  XR CHEST PORTABLE URGENT 1V                            PROCEDURE DATE:  04/06/2020          INTERPRETATION:  Portable chest radiograph        CLINICAL INFORMATION:   Short of breath. Fever and cough    TECHNIQUE:  Portable  AP view of the chest was obtained.    COMPARISON: No previous examinations are available for review.    FINDINGS:   The lungs  show bilateral lung peripheral lung scattered airspace consolidations are concerning for infectious pneumonia. There are basilar airspace consolidation obscuring the diaphragmatic contours.    The heart and mediastinum are within normal limits.    Visualized osseous structures are intact.        IMPRESSION:   Bilateral lung airspace consolidations concerning for infectious bacterial or viral pneumonia..      < end of copied text >      Consultant(s) Notes Reviewed:   YES      Plan of care was discussed with patient and /or primary care giver; all questions and concerns were addressed

## 2020-04-13 NOTE — PROGRESS NOTE ADULT - PROBLEM SELECTOR PLAN 1
Acute hypoxemic respiratory failure likely due to COVID-19 related pneumonia   continue hydroxychloroquine for COVID related illness  continue to monitor closely  continue supplemental oxygen as need  Supportive measures  tylenol PRN for fever  Continue Isolation precautions based on COVID-19 infection control protocol.
Acute hypoxemic respiratory failure likely due to COVID-19 related pneumonia  still requiring high dosing O2 on NRM with borderline SaO2  start solumedrol 40mg Q8hrs x 3 days as d/w covering attending: Dr White  continue supplemental oxygen as need  Supportive measures  tylenol PRN for fever  Continue Isolation precautions based on COVID-19 infection control protocol.
Oxygen supplementation as needed  - Proventil inhalers PRN  - Completed course of hydroxychloroquine & azithromycin   - monitor labs as needed -including cbc, bmp, LFTs, ferritin and CRP  - Encourage prone positioning 10-15 mins /hr as tolerated  - Isolation precautions Airborne/Contact for COVID-19 per infection control protocol
start Plavix and ASA

## 2020-04-13 NOTE — PROGRESS NOTE ADULT - PROBLEM SELECTOR PLAN 2
As above
poorly controlled DM with A1c 7.8%  home dose metformin held on admission  fingerstick fairly controlled  continue fingerstick AC & HS with WILLEM  carb control diet
poorly controlled DM with A1c 7.8%  home dose metformin held on admission  fingerstick fairly controlled  continue fingerstick AC & HS with WILLEM  carb control diet
: poorly controlled DM with A1c 7.8%  home dose metformin held on admission  fingerstick fairly controlled  continue fingerstick AC & HS with WILLEM  carb control diet.

## 2020-04-13 NOTE — PROGRESS NOTE ADULT - PROBLEM SELECTOR PLAN 3
Monitor blood glucose AC/ HS   Correctional insulin via sliding scale  HgA1C level 7.8%
borderline low BP in setting of infectious process  hold laura meds  monitor Vitals per floor routine
borderline low BP in setting of infectious process  hold laura meds  monitor Vitals per floor routine
Continue steroids as ordered  Continue NRB, monitor O2 sat closely

## 2020-04-13 NOTE — PROGRESS NOTE ADULT - ATTENDING COMMENTS
Patient is seen at the bedside.  He has removed his nasal oxygen.  He still c/o chest pressure in the substernal area.     Vital Signs Last 24 Hrs  T(C): 37.2 (13 Apr 2020 16:08), Max: 37.2 (13 Apr 2020 16:08)  T(F): 99 (13 Apr 2020 16:08), Max: 99 (13 Apr 2020 16:08)  HR: 65 (13 Apr 2020 16:08) (65 - 93)  BP: 94/56 (13 Apr 2020 16:08) (92/44 - 107/61)  BP(mean): 65 (13 Apr 2020 16:08) (65 - 65)  RR: 20 (13 Apr 2020 16:08) (19 - 20)  SpO2: 97% (13 Apr 2020 16:08) (93% - 99%)    IMP: Hypoxic respiratory failure secondary to COVID-19, improving.  May soon be stable on room air.   Plan:  Check oxygen sat on RA.  Possible discharge tomorrow.

## 2020-04-14 VITALS
SYSTOLIC BLOOD PRESSURE: 101 MMHG | RESPIRATION RATE: 18 BRPM | DIASTOLIC BLOOD PRESSURE: 76 MMHG | OXYGEN SATURATION: 97 % | TEMPERATURE: 98 F | HEART RATE: 73 BPM

## 2020-04-14 LAB
ALBUMIN SERPL ELPH-MCNC: 2.5 G/DL — LOW (ref 3.5–5)
ALP SERPL-CCNC: 94 U/L — SIGNIFICANT CHANGE UP (ref 40–120)
ALT FLD-CCNC: 75 U/L DA — HIGH (ref 10–60)
ANION GAP SERPL CALC-SCNC: 5 MMOL/L — SIGNIFICANT CHANGE UP (ref 5–17)
AST SERPL-CCNC: 10 U/L — SIGNIFICANT CHANGE UP (ref 10–40)
BASOPHILS # BLD AUTO: 0.02 K/UL — SIGNIFICANT CHANGE UP (ref 0–0.2)
BASOPHILS NFR BLD AUTO: 0.2 % — SIGNIFICANT CHANGE UP (ref 0–2)
BILIRUB SERPL-MCNC: 0.5 MG/DL — SIGNIFICANT CHANGE UP (ref 0.2–1.2)
BUN SERPL-MCNC: 18 MG/DL — SIGNIFICANT CHANGE UP (ref 7–18)
CALCIUM SERPL-MCNC: 8.6 MG/DL — SIGNIFICANT CHANGE UP (ref 8.4–10.5)
CHLORIDE SERPL-SCNC: 102 MMOL/L — SIGNIFICANT CHANGE UP (ref 96–108)
CK SERPL-CCNC: 36 U/L — SIGNIFICANT CHANGE UP (ref 35–232)
CO2 SERPL-SCNC: 29 MMOL/L — SIGNIFICANT CHANGE UP (ref 22–31)
CREAT SERPL-MCNC: 0.87 MG/DL — SIGNIFICANT CHANGE UP (ref 0.5–1.3)
EOSINOPHIL # BLD AUTO: 0.02 K/UL — SIGNIFICANT CHANGE UP (ref 0–0.5)
EOSINOPHIL NFR BLD AUTO: 0.2 % — SIGNIFICANT CHANGE UP (ref 0–6)
GLUCOSE BLDC GLUCOMTR-MCNC: 151 MG/DL — HIGH (ref 70–99)
GLUCOSE BLDC GLUCOMTR-MCNC: 168 MG/DL — HIGH (ref 70–99)
GLUCOSE BLDC GLUCOMTR-MCNC: 190 MG/DL — HIGH (ref 70–99)
GLUCOSE SERPL-MCNC: 168 MG/DL — HIGH (ref 70–99)
HCT VFR BLD CALC: 36.3 % — LOW (ref 39–50)
HGB BLD-MCNC: 11.8 G/DL — LOW (ref 13–17)
IMM GRANULOCYTES NFR BLD AUTO: 4 % — HIGH (ref 0–1.5)
LYMPHOCYTES # BLD AUTO: 2.93 K/UL — SIGNIFICANT CHANGE UP (ref 1–3.3)
LYMPHOCYTES # BLD AUTO: 28.4 % — SIGNIFICANT CHANGE UP (ref 13–44)
MAGNESIUM SERPL-MCNC: 2.8 MG/DL — HIGH (ref 1.6–2.6)
MCHC RBC-ENTMCNC: 27.6 PG — SIGNIFICANT CHANGE UP (ref 27–34)
MCHC RBC-ENTMCNC: 32.5 GM/DL — SIGNIFICANT CHANGE UP (ref 32–36)
MCV RBC AUTO: 84.8 FL — SIGNIFICANT CHANGE UP (ref 80–100)
MONOCYTES # BLD AUTO: 0.81 K/UL — SIGNIFICANT CHANGE UP (ref 0–0.9)
MONOCYTES NFR BLD AUTO: 7.8 % — SIGNIFICANT CHANGE UP (ref 2–14)
NEUTROPHILS # BLD AUTO: 6.14 K/UL — SIGNIFICANT CHANGE UP (ref 1.8–7.4)
NEUTROPHILS NFR BLD AUTO: 59.4 % — SIGNIFICANT CHANGE UP (ref 43–77)
NRBC # BLD: 0 /100 WBCS — SIGNIFICANT CHANGE UP (ref 0–0)
PHOSPHATE SERPL-MCNC: 3.9 MG/DL — SIGNIFICANT CHANGE UP (ref 2.5–4.5)
PLATELET # BLD AUTO: 528 K/UL — HIGH (ref 150–400)
POTASSIUM SERPL-MCNC: 4.3 MMOL/L — SIGNIFICANT CHANGE UP (ref 3.5–5.3)
POTASSIUM SERPL-SCNC: 4.3 MMOL/L — SIGNIFICANT CHANGE UP (ref 3.5–5.3)
PROT SERPL-MCNC: 6.4 G/DL — SIGNIFICANT CHANGE UP (ref 6–8.3)
RBC # BLD: 4.28 M/UL — SIGNIFICANT CHANGE UP (ref 4.2–5.8)
RBC # FLD: 12 % — SIGNIFICANT CHANGE UP (ref 10.3–14.5)
SODIUM SERPL-SCNC: 136 MMOL/L — SIGNIFICANT CHANGE UP (ref 135–145)
WBC # BLD: 10.33 K/UL — SIGNIFICANT CHANGE UP (ref 3.8–10.5)
WBC # FLD AUTO: 10.33 K/UL — SIGNIFICANT CHANGE UP (ref 3.8–10.5)

## 2020-04-14 PROCEDURE — 82550 ASSAY OF CK (CPK): CPT

## 2020-04-14 PROCEDURE — 83735 ASSAY OF MAGNESIUM: CPT

## 2020-04-14 PROCEDURE — 36415 COLL VENOUS BLD VENIPUNCTURE: CPT

## 2020-04-14 PROCEDURE — 83615 LACTATE (LD) (LDH) ENZYME: CPT

## 2020-04-14 PROCEDURE — 84484 ASSAY OF TROPONIN QUANT: CPT

## 2020-04-14 PROCEDURE — 87635 SARS-COV-2 COVID-19 AMP PRB: CPT

## 2020-04-14 PROCEDURE — 83605 ASSAY OF LACTIC ACID: CPT

## 2020-04-14 PROCEDURE — 99285 EMERGENCY DEPT VISIT HI MDM: CPT

## 2020-04-14 PROCEDURE — 80053 COMPREHEN METABOLIC PANEL: CPT

## 2020-04-14 PROCEDURE — 71045 X-RAY EXAM CHEST 1 VIEW: CPT

## 2020-04-14 PROCEDURE — 85379 FIBRIN DEGRADATION QUANT: CPT

## 2020-04-14 PROCEDURE — 84100 ASSAY OF PHOSPHORUS: CPT

## 2020-04-14 PROCEDURE — 93005 ELECTROCARDIOGRAM TRACING: CPT

## 2020-04-14 PROCEDURE — 85027 COMPLETE CBC AUTOMATED: CPT

## 2020-04-14 PROCEDURE — 86140 C-REACTIVE PROTEIN: CPT

## 2020-04-14 PROCEDURE — 82803 BLOOD GASES ANY COMBINATION: CPT

## 2020-04-14 PROCEDURE — 82962 GLUCOSE BLOOD TEST: CPT

## 2020-04-14 PROCEDURE — 84145 PROCALCITONIN (PCT): CPT

## 2020-04-14 PROCEDURE — 83036 HEMOGLOBIN GLYCOSYLATED A1C: CPT

## 2020-04-14 PROCEDURE — 82728 ASSAY OF FERRITIN: CPT

## 2020-04-14 RX ORDER — METFORMIN HYDROCHLORIDE 850 MG/1
1 TABLET ORAL
Qty: 0 | Refills: 0 | DISCHARGE

## 2020-04-14 RX ORDER — ACETAMINOPHEN 500 MG
2 TABLET ORAL
Qty: 20 | Refills: 0
Start: 2020-04-14

## 2020-04-14 RX ORDER — LISINOPRIL 2.5 MG/1
1 TABLET ORAL
Qty: 0 | Refills: 0 | DISCHARGE

## 2020-04-14 RX ORDER — ALBUTEROL 90 UG/1
2 AEROSOL, METERED ORAL
Qty: 0 | Refills: 0 | DISCHARGE

## 2020-04-14 RX ORDER — CHOLECALCIFEROL (VITAMIN D3) 125 MCG
1000 CAPSULE ORAL
Qty: 30 | Refills: 0
Start: 2020-04-14

## 2020-04-14 RX ORDER — ATORVASTATIN CALCIUM 80 MG/1
1 TABLET, FILM COATED ORAL
Qty: 30 | Refills: 0
Start: 2020-04-14

## 2020-04-14 RX ORDER — ACETAMINOPHEN 500 MG
2 TABLET ORAL
Qty: 10 | Refills: 0
Start: 2020-04-14

## 2020-04-14 RX ORDER — ATORVASTATIN CALCIUM 80 MG/1
1 TABLET, FILM COATED ORAL
Qty: 0 | Refills: 0 | DISCHARGE

## 2020-04-14 RX ORDER — LISINOPRIL 2.5 MG/1
1 TABLET ORAL
Qty: 30 | Refills: 0
Start: 2020-04-14

## 2020-04-14 RX ORDER — ASPIRIN/CALCIUM CARB/MAGNESIUM 324 MG
1 TABLET ORAL
Qty: 0 | Refills: 0 | DISCHARGE

## 2020-04-14 RX ORDER — METFORMIN HYDROCHLORIDE 850 MG/1
1 TABLET ORAL
Qty: 30 | Refills: 0
Start: 2020-04-14

## 2020-04-14 RX ADMIN — Medication 1000 UNIT(S): at 12:37

## 2020-04-14 RX ADMIN — Medication 1: at 17:07

## 2020-04-14 RX ADMIN — HEPARIN SODIUM 5000 UNIT(S): 5000 INJECTION INTRAVENOUS; SUBCUTANEOUS at 05:26

## 2020-04-14 RX ADMIN — Medication 81 MILLIGRAM(S): at 12:37

## 2020-04-14 RX ADMIN — Medication 1: at 12:35

## 2020-04-14 RX ADMIN — Medication 1: at 08:50

## 2020-04-14 RX ADMIN — CLOPIDOGREL BISULFATE 75 MILLIGRAM(S): 75 TABLET, FILM COATED ORAL at 12:36

## 2020-04-14 RX ADMIN — HEPARIN SODIUM 5000 UNIT(S): 5000 INJECTION INTRAVENOUS; SUBCUTANEOUS at 12:38

## 2020-04-14 NOTE — PROGRESS NOTE ADULT - SUBJECTIVE AND OBJECTIVE BOX
COVERING  ATTENDING NOTE     INTERVAL HPI: no new complaints.  Doing well.    ROS: no CP, SOB and slowly  improving    MEDICATIONS  (STANDING):  aspirin enteric coated 81 milliGRAM(s) Oral daily  cholecalciferol 1000 Unit(s) Oral daily  clopidogrel Tablet 75 milliGRAM(s) Oral daily  heparin  Injectable 5000 Unit(s) SubCutaneous every 8 hours  insulin glargine Injectable (LANTUS) 10 Unit(s) SubCutaneous at bedtime  insulin lispro (HumaLOG) corrective regimen sliding scale   SubCutaneous three times a day before meals    MEDICATIONS  (PRN):  acetaminophen   Tablet .. 650 milliGRAM(s) Oral every 6 hours PRN Temp greater or equal to 38C (100.4F), Mild Pain (1 - 3)      Vital Signs Last 24 Hrs  T(C): 37.1 (14 Apr 2020 12:11), Max: 37.2 (13 Apr 2020 16:08)  T(F): 98.8 (14 Apr 2020 12:11), Max: 99 (13 Apr 2020 16:08)  HR: 63 (14 Apr 2020 12:11) (63 - 83)  BP: 107/71 (14 Apr 2020 12:11) (92/53 - 107/71)  BP(mean): 42 (13 Apr 2020 23:53) (42 - 69)  RR: 18 (14 Apr 2020 12:11) (18 - 20)  SpO2: 98% (14 Apr 2020 12:11) (94% - 98%)    _________________  PHYSICAL EXAM:  ---------------------------   NAD; Normocephalic;   LUNGS - no wheezing; decreased bilateral air entry  HEART: S1 S2+   ABDOMEN: Soft, Nontender, non distended  EXTREMITIES: no cyanosis; no edema  NERVOUS SYSTEM:  Awake and alert; no focal neuro  deficits    _________________________________________________  LABS:                        11.8   10.33 )-----------( 528      ( 14 Apr 2020 06:22 )             36.3     04-14    136  |  102  |  18  ----------------------------<  168<H>  4.3   |  29  |  0.87    Ca    8.6      14 Apr 2020 06:22  Phos  3.9     04-14  Mg     2.8     04-14    TPro  6.4  /  Alb  2.5<L>  /  TBili  0.5  /  DBili  x   /  AST  10  /  ALT  75<H>  /  AlkPhos  94  04-14      CAPILLARY BLOOD GLUCOSE      POCT Blood Glucose.: 190 mg/dL (14 Apr 2020 12:05)  POCT Blood Glucose.: 168 mg/dL (14 Apr 2020 08:20)  POCT Blood Glucose.: 146 mg/dL (13 Apr 2020 21:06)  POCT Blood Glucose.: 68 mg/dL (13 Apr 2020 16:31)      Impression /Plan:   1. Pneumonia due to COVID-19 with hypoxia  D/C Home    Plan of care was discussed  and aligned with patient's wishes.

## 2020-04-14 NOTE — DISCHARGE NOTE NURSING/CASE MANAGEMENT/SOCIAL WORK - PATIENT PORTAL LINK FT
You can access the FollowMyHealth Patient Portal offered by [facility] by registering at the following website: [email or web address]. By joining [facility]’s FollowMyHealth portal, you will also be able to view your health information using other applications (apps) compatible with our system.

## 2020-12-16 NOTE — DISCHARGE NOTE PROVIDER - NSDCHOSPICE_GEN_A_CORE
Steroid drop 4 times daily.  Erythromycin ointment 4 times daily.    Instill the steroid drop first than 20 minutes later may add erythromycin ointment.  
No

## 2023-07-11 NOTE — H&P ADULT - NSHPRISKHIVSCREEN_GEN_ALL_CORE
SUBJECTIVE:    Chief Compliant: had concerns including Medication Management (Fasting).    HPI: This 64-year-old male was seen October 20, 2022 for chronic care follow-up and at that time blood work was ordered but not done by patient.  Patient was however seen for hospital follow-up April 4, 2023.  Patient is here today for overdue 3-month chronic care follow-up in regards to dyslipidemia, anemia, elevated blood pressure readings, chronic renal disease stage IIIa, hyperglycemia, chronic cough, bronchospasm, hypoxia, history of COVID-pneumonia.  See office visit EMR notes April 4, 2023, October 20, 2022, March 21, 2022.  He is taking his medication.  He is trying to follow a low sugar, low-cholesterol diet.  Blood pressure diary = not done.  He is taking p.o. fluids well and urinating well.  He denies any bleeding sites.  He denies any polydipsia or hypoglycemic episodes.  He denies any headaches or edema.  He is continue to take 4 L of supplemental oxygen under management with Dr. Hendrickson (pulmonologist).  The cough, bronchospasm symptoms are reportedly doing well with Symbicort inhaler and also using albuterol inhaler if needed.  CBC was normal, BMP showed decreased GFR (55), elevated creatinine and BUN (April 4, 2023).  Last CMP was done in March 2023.  Last TSH was done in March 2022.  Patient has not had lipid levels done in a long time.  Last EKG was done in March 2023 at [facility].  Patient is fasting today.  He needs refills on medications from me.  He is interested in having fecal occult blood test kit done.      Review of Systems   Constitutional: Negative for chills and fever.   HENT: Negative for sore throat and trouble swallowing.    Respiratory: Negative for cough and shortness of breath.         (See HPI)   Cardiovascular: Negative for chest pain and palpitations.        (See HPI)   Gastrointestinal: Negative for nausea and vomiting.   Endocrine: Negative for polydipsia and polyuria.         (See HPI)   Genitourinary: Negative for difficulty urinating.        (See HPI)   Neurological: Negative for dizziness and headaches.   Hematological:        (See HPI)       ALLERGIES:   Allergen Reactions   • Penicillins HIVES     Current Outpatient Medications   Medication Sig Dispense Refill   • cyclobenzaprine (FLEXERIL) 10 MG tablet Take 1 tablet by mouth at bedtime as needed for Muscle spasms. 10 tablet 1   • Symbicort 80-4.5 MCG/ACT inhaler Inhale 2 puffs into the lungs in the morning and 2 puffs in the evening. 10.2 g 12   • cetirizine (ZyrTEC) 10 MG tablet Take 1 tablet by mouth daily. 30 tablet 5   • albuterol 108 (90 Base) MCG/ACT inhaler Inhale 2 puffs into the lungs every 4 hours as needed for Shortness of Breath or Wheezing. 18 g 11   • predniSONE (DELTASONE) 20 MG tablet Take 2 tablets by mouth daily. 10 tablet 0   • acetaminophen (TYLENOL) 500 MG tablet Take 1 tablet by mouth every 6 hours as needed (Pain, headache, fever.). 10 tablet 0   • oxygen (O2) gas Inhale 4 L/min into the lungs continuous. 4 LPM O2 at home       No current facility-administered medications for this visit.     Patient Active Problem List   Diagnosis   • History of 2019 novel coronavirus disease (COVID-19)   • Pneumonia due to COVID-19 virus   • Reactive thrombocytosis   • Hyperglycemia   • Anemia   • Hypoxia   • Syncope, unspecified syncope type   • Chronic cough   • Bronchospasm   • Elevated blood pressure reading   • Health care maintenance   • Dyslipidemia   • Stage 3a chronic kidney disease (CMD)     Past Medical History:   Diagnosis Date   • Anemia 8/25/2022   • Essential (primary) hypertension      Past Surgical History:   Procedure Laterality Date   • Thyroid surgery  2018     Family History   Problem Relation Age of Onset   • Hypertension Mother    • Cancer, Lung Mother    • Heart Brother    • Diabetes Brother    • Hypertension Maternal Grandfather      Social History     Socioeconomic History   • Marital status:  /Civil Union     Spouse name: Jesusita   • Number of children: Not on file   • Years of education: Not on file   • Highest education level: Not on file   Occupational History   • Occupation: disability    Tobacco Use   • Smoking status: Never   • Smokeless tobacco: Never   Vaping Use   • Vaping Use: never used   Substance and Sexual Activity   • Alcohol use: Never   • Drug use: Not Currently   • Sexual activity: Not on file   Other Topics Concern   • Not on file   Social History Narrative   • Not on file     Social Determinants of Health     Financial Resource Strain: Low Risk  (3/28/2023)    Financial Resource Strain    • Social Determinants: Financial Resource Strain: None   Food Insecurity: No Food Insecurity (3/28/2023)    Food Insecurity    • Social Determinants: Food Insecurity: Rarely   Transportation Needs: No Transportation Needs (3/28/2023)    PRAPARE - Transportation    • Lack of Transportation (Medical): No    • Lack of Transportation (Non-Medical): No   Physical Activity: Not on file   Stress: Not on file   Social Connections: Socially Integrated (3/28/2023)    Social Connections    • Social Determinants: Social Connections: 5 or more times a week   Intimate Partner Violence: Not At Risk (3/28/2023)    Intimate Partner Violence    • Social Determinants: Intimate Partner Violence Past Fear: No    • Social Determinants: Intimate Partner Violence Current Fear: No       OBJECTIVE:  Visit Vitals  /80 (BP Location: RUE - Right upper extremity, Patient Position: Sitting, Cuff Size: Regular)   Pulse 87   Temp 97.7 °F (36.5 °C) (Temporal)   Ht 5' 11\" (1.803 m)   Wt 100.1 kg (220 lb 9.6 oz)   SpO2 97%   BMI 30.77 kg/m²     Physical Exam  Constitutional:       General: He is not in acute distress.     Appearance: He is well-developed.   HENT:      Mouth/Throat:      Mouth: Mucous membranes are moist.      Pharynx: Oropharynx is clear.   Eyes:      General: No scleral icterus.     Pupils: Pupils are  equal, round, and reactive to light.   Cardiovascular:      Rate and Rhythm: Normal rate and regular rhythm.      Pulses:           Radial pulses are 2+ on the right side and 2+ on the left side.      Heart sounds: Normal heart sounds. No murmur heard.  Pulmonary:      Effort: Pulmonary effort is normal. No respiratory distress.      Breath sounds: Normal breath sounds. No wheezing or rales.      Comments: ( Decreased breath sounds bilaterally, diffusely.  No respiratory distress.)  Abdominal:      General: Bowel sounds are normal.      Palpations: Abdomen is soft. There is no mass.      Tenderness: There is no abdominal tenderness. There is no right CVA tenderness or left CVA tenderness.   Musculoskeletal:         General: No tenderness. Normal range of motion.      Cervical back: Neck supple.   Lymphadenopathy:      Head:      Right side of head: No submandibular adenopathy.      Left side of head: No submandibular adenopathy.      Cervical: No cervical adenopathy.   Neurological:      Mental Status: He is alert.      Coordination: Coordination normal.      Gait: Gait normal.   Psychiatric:         Mood and Affect: Mood normal.         Behavior: Behavior is cooperative.         Judgment: Judgment normal.         ASSESSMENT   ED Diagnosis   1. Dyslipidemia  Lipid Panel With Reflex    Thyroid Stimulating Hormone Reflex      2. Anemia, unspecified type  Thyroid Stimulating Hormone Reflex      3. Hyperglycemia  Basic Metabolic Panel    Thyroid Stimulating Hormone Reflex      4. Stage 3a chronic kidney disease (CMD)  Basic Metabolic Panel      5. Elevated blood pressure reading  Basic Metabolic Panel    Thyroid Stimulating Hormone Reflex      6. Chronic cough  POCT Pulse Oximetry, In-Office      7. Bronchospasm  POCT Pulse Oximetry, In-Office      8. Hypoxia  POCT Pulse Oximetry, In-Office      9. Pneumonia due to COVID-19 virus  POCT Pulse Oximetry, In-Office      10. Health care maintenance  Occult Blood - iFOB (aka  FIT)    CANCELED: Occult Blood - iFOB (aka FIT)          PLAN:  1.  Discussion: Dyslipidemia, due to recheck lipid levels.  2.  Discussion: Anemia, stable CBC from April 2023.  3.  Discussion: Hyperglycemia, normal glucose level from April 2023.  4.  Discussion: Chronic renal disease stage IIIa, appears to be stable from blood work April 2023, will continue to monitor.  5.  Discussion: Elevated blood pressure readings in the past, blood pressure appears to be stable based on today's office reading the patient is not doing home blood pressure diary.  6.  Discussion: Chronic cough, bronchospasm, reportedly stable and doing well with Symbicort inhaler under management with pulmonologist.  7.  Discussion: Hypoxia, status post previous COVID-pneumonia, using 4 L of supplemental oxygen, under management with pulmonologist.  8.  Discussion: Blood work results from April 4, 2023.  9.  BMP, TSH, lipid levels to be done today, as ordered above.  10.  Pulse oximetry to be done in office today, as ordered above = 97%.  11.  Fecal occult blood test kit given and ordered as above (per patient request).  12.  Continue to work hard on low sugar, low-salt, renal diet.  13.  Advise doing occasional blood pressure readings at home and keep blood pressure diary.  14.  Cyclobenzaprine muscle relaxer medication refilled as ordered below (per patient request).  15.  Continue other medications from specialist.  16.  Continue OTC medication.  17.  Follow-up with Dr. Hendrickson (pulmonologist), next month as scheduled.  18.  Continue supplemental oxygen at 4 L as ordered by pulmonologist.  19.  Hydration, balanced diet.  20.  8 hours of sleep per night, multiple vitamin daily.  21.  Return to office in 3 to 6 months, depending on blood test results.      Orders Placed This Encounter   • Occult Blood - iFOB (aka FIT)   • Basic Metabolic Panel   • Lipid Panel With Reflex   • Thyroid Stimulating Hormone Reflex   • POCT Pulse Oximetry, In-Office    • cyclobenzaprine (FLEXERIL) 10 MG tablet             Electronically signed by:  MD Frank Jackson MD  7/11/2023     Unable to offer due to clinical condition

## 2024-05-04 NOTE — ED ADULT NURSE NOTE - NSIMPLEMENTINTERV_GEN_ALL_ED
General
Implemented All Fall Risk Interventions:  [place] to call system. Call bell, personal items and telephone within reach. Instruct patient to call for assistance. Room bathroom lighting operational. Non-slip footwear when patient is off stretcher. Physically safe environment: no spills, clutter or unnecessary equipment. Stretcher in lowest position, wheels locked, appropriate side rails in place. Provide visual cue, wrist band, yellow gown, etc. Monitor gait and stability. Monitor for mental status changes and reorient to person, place, and time. Review medications for side effects contributing to fall risk. Reinforce activity limits and safety measures with patient and family.